# Patient Record
Sex: FEMALE | Race: WHITE | NOT HISPANIC OR LATINO | URBAN - METROPOLITAN AREA
[De-identification: names, ages, dates, MRNs, and addresses within clinical notes are randomized per-mention and may not be internally consistent; named-entity substitution may affect disease eponyms.]

---

## 2017-09-21 ENCOUNTER — OUTPATIENT (OUTPATIENT)
Dept: OUTPATIENT SERVICES | Facility: HOSPITAL | Age: 82
LOS: 1 days | End: 2017-09-21
Payer: MEDICARE

## 2017-09-21 VITALS
HEIGHT: 66 IN | WEIGHT: 122.58 LBS | DIASTOLIC BLOOD PRESSURE: 75 MMHG | RESPIRATION RATE: 80 BRPM | HEART RATE: 79 BPM | TEMPERATURE: 99 F | OXYGEN SATURATION: 99 % | SYSTOLIC BLOOD PRESSURE: 159 MMHG

## 2017-09-21 VITALS
HEART RATE: 89 BPM | OXYGEN SATURATION: 99 % | DIASTOLIC BLOOD PRESSURE: 78 MMHG | RESPIRATION RATE: 17 BRPM | SYSTOLIC BLOOD PRESSURE: 154 MMHG

## 2017-09-21 DIAGNOSIS — H40.9 UNSPECIFIED GLAUCOMA: Chronic | ICD-10-CM

## 2017-09-21 DIAGNOSIS — Z98.890 OTHER SPECIFIED POSTPROCEDURAL STATES: Chronic | ICD-10-CM

## 2017-09-21 DIAGNOSIS — E11.9 TYPE 2 DIABETES MELLITUS WITHOUT COMPLICATIONS: Chronic | ICD-10-CM

## 2017-09-21 DIAGNOSIS — H25.811 COMBINED FORMS OF AGE-RELATED CATARACT, RIGHT EYE: ICD-10-CM

## 2017-09-21 DIAGNOSIS — I65.29 OCCLUSION AND STENOSIS OF UNSPECIFIED CAROTID ARTERY: Chronic | ICD-10-CM

## 2017-09-21 DIAGNOSIS — Z90.49 ACQUIRED ABSENCE OF OTHER SPECIFIED PARTS OF DIGESTIVE TRACT: Chronic | ICD-10-CM

## 2017-09-21 DIAGNOSIS — I10 ESSENTIAL (PRIMARY) HYPERTENSION: Chronic | ICD-10-CM

## 2017-09-21 DIAGNOSIS — E78.5 HYPERLIPIDEMIA, UNSPECIFIED: Chronic | ICD-10-CM

## 2017-09-21 PROCEDURE — C1780: CPT

## 2017-09-21 PROCEDURE — 66982 XCAPSL CTRC RMVL CPLX WO ECP: CPT | Mod: RT

## 2017-09-21 NOTE — ASU DISCHARGE PLAN (ADULT/PEDIATRIC). - SPECIAL INSTRUCTIONS
Your eye patch will remain on overnight. Your doctor will remove it at your appointment tomorrow and instruct you on what drops to take at that time. Continue drops as usual in the other eye. Bring the eye kit and all of your other eye medications  to the office for each visit. You may experience some itching or scratchiness following surgery. This is normal and is usually relieved with Tylenol which can be taken 650mg by mouth every 4-6 hours for pain. Avoid Aspirin or Motrin. If you experience persistent decrease in vision, pain, excessive bleeding , temperature above 101, persistent nausea or vomiting contact your surgeon at [phone].

## 2017-09-21 NOTE — ASU DISCHARGE PLAN (ADULT/PEDIATRIC). - NOTIFY
Bleeding that does not stop/Fever greater than 101/Inability to Tolerate Liquids or Foods/Pain not relieved by Medications/Persistent Nausea and Vomiting

## 2017-11-16 ENCOUNTER — OUTPATIENT (OUTPATIENT)
Dept: OUTPATIENT SERVICES | Facility: HOSPITAL | Age: 82
LOS: 1 days | End: 2017-11-16
Payer: MEDICARE

## 2017-11-16 VITALS
HEART RATE: 96 BPM | SYSTOLIC BLOOD PRESSURE: 136 MMHG | OXYGEN SATURATION: 97 % | DIASTOLIC BLOOD PRESSURE: 81 MMHG | RESPIRATION RATE: 19 BRPM

## 2017-11-16 VITALS
WEIGHT: 125.22 LBS | SYSTOLIC BLOOD PRESSURE: 157 MMHG | HEART RATE: 94 BPM | TEMPERATURE: 99 F | OXYGEN SATURATION: 97 % | DIASTOLIC BLOOD PRESSURE: 90 MMHG | HEIGHT: 62 IN | RESPIRATION RATE: 20 BRPM

## 2017-11-16 DIAGNOSIS — Z98.890 OTHER SPECIFIED POSTPROCEDURAL STATES: Chronic | ICD-10-CM

## 2017-11-16 DIAGNOSIS — H25.812 COMBINED FORMS OF AGE-RELATED CATARACT, LEFT EYE: ICD-10-CM

## 2017-11-16 DIAGNOSIS — Z98.49 CATARACT EXTRACTION STATUS, UNSPECIFIED EYE: Chronic | ICD-10-CM

## 2017-11-16 DIAGNOSIS — Z90.49 ACQUIRED ABSENCE OF OTHER SPECIFIED PARTS OF DIGESTIVE TRACT: Chronic | ICD-10-CM

## 2017-11-16 LAB — GLUCOSE BLDC GLUCOMTR-MCNC: 100 MG/DL — HIGH (ref 70–99)

## 2017-11-16 PROCEDURE — 82962 GLUCOSE BLOOD TEST: CPT

## 2017-11-16 PROCEDURE — 66982 XCAPSL CTRC RMVL CPLX WO ECP: CPT | Mod: LT

## 2017-11-16 PROCEDURE — C1780: CPT

## 2017-11-16 NOTE — ASU DISCHARGE PLAN (ADULT/PEDIATRIC). - SPECIAL INSTRUCTIONS
Your eye patch will remain on overnight. Your doctor will remove it at your appointment tomorrow and instruct you on what drops to take at that time. Continue drops as usual in the other eye. Bring the eye kit and all of your other eye medications  to the office for each visit. You may experience some itching or scratchiness following surgery. This is normal and is usually relieved with Tylenol which can be taken 650mg by mouth every 4-6 hours for pain. Avoid Aspirin or Motrin. If you experience persistent decrease in vision, pain, excessive bleeding , temperature above 101, persistent nausea or vomiting contact your surgeon at [phone].

## 2017-11-16 NOTE — ASU DISCHARGE PLAN (ADULT/PEDIATRIC). - NOTIFY
Persistent Nausea and Vomiting/Pain not relieved by Medications/Swelling that continues/Fever greater than 101/Bleeding that does not stop

## 2017-11-16 NOTE — ASU PATIENT PROFILE, ADULT - PSH
History of colonoscopy    Status post cataract extraction  right eye done in September 2017  Status post colectomy

## 2018-01-31 ENCOUNTER — EMERGENCY (EMERGENCY)
Facility: HOSPITAL | Age: 83
LOS: 1 days | Discharge: HOME | End: 2018-01-31
Attending: EMERGENCY MEDICINE
Payer: MEDICARE

## 2018-01-31 VITALS
SYSTOLIC BLOOD PRESSURE: 183 MMHG | OXYGEN SATURATION: 99 % | TEMPERATURE: 97 F | RESPIRATION RATE: 18 BRPM | DIASTOLIC BLOOD PRESSURE: 68 MMHG | HEART RATE: 88 BPM

## 2018-01-31 VITALS
RESPIRATION RATE: 18 BRPM | WEIGHT: 139.99 LBS | SYSTOLIC BLOOD PRESSURE: 220 MMHG | OXYGEN SATURATION: 99 % | HEART RATE: 68 BPM | HEIGHT: 63 IN | DIASTOLIC BLOOD PRESSURE: 96 MMHG

## 2018-01-31 DIAGNOSIS — Z98.49 CATARACT EXTRACTION STATUS, UNSPECIFIED EYE: Chronic | ICD-10-CM

## 2018-01-31 DIAGNOSIS — Z98.890 OTHER SPECIFIED POSTPROCEDURAL STATES: Chronic | ICD-10-CM

## 2018-01-31 DIAGNOSIS — Z90.49 ACQUIRED ABSENCE OF OTHER SPECIFIED PARTS OF DIGESTIVE TRACT: Chronic | ICD-10-CM

## 2018-01-31 LAB
ALBUMIN SERPL ELPH-MCNC: 3.8 G/DL — SIGNIFICANT CHANGE UP (ref 3.5–5)
ALP SERPL-CCNC: 128 U/L — HIGH (ref 40–120)
ALT FLD-CCNC: 32 U/L DA — SIGNIFICANT CHANGE UP (ref 10–60)
ANION GAP SERPL CALC-SCNC: 7 MMOL/L — SIGNIFICANT CHANGE UP (ref 5–17)
AST SERPL-CCNC: 31 U/L — SIGNIFICANT CHANGE UP (ref 10–40)
BASOPHILS # BLD AUTO: 0.1 K/UL — SIGNIFICANT CHANGE UP (ref 0–0.2)
BASOPHILS NFR BLD AUTO: 0.7 % — SIGNIFICANT CHANGE UP (ref 0–2)
BILIRUB SERPL-MCNC: 0.5 MG/DL — SIGNIFICANT CHANGE UP (ref 0.2–1.2)
BUN SERPL-MCNC: 26 MG/DL — HIGH (ref 7–18)
CALCIUM SERPL-MCNC: 9.8 MG/DL — SIGNIFICANT CHANGE UP (ref 8.4–10.5)
CHLORIDE SERPL-SCNC: 102 MMOL/L — SIGNIFICANT CHANGE UP (ref 96–108)
CO2 SERPL-SCNC: 28 MMOL/L — SIGNIFICANT CHANGE UP (ref 22–31)
CREAT SERPL-MCNC: 0.84 MG/DL — SIGNIFICANT CHANGE UP (ref 0.5–1.3)
EOSINOPHIL # BLD AUTO: 0.1 K/UL — SIGNIFICANT CHANGE UP (ref 0–0.5)
EOSINOPHIL NFR BLD AUTO: 1.2 % — SIGNIFICANT CHANGE UP (ref 0–6)
GLUCOSE SERPL-MCNC: 147 MG/DL — HIGH (ref 70–99)
HCT VFR BLD CALC: 45.6 % — HIGH (ref 34.5–45)
HGB BLD-MCNC: 13.7 G/DL — SIGNIFICANT CHANGE UP (ref 11.5–15.5)
LYMPHOCYTES # BLD AUTO: 0.8 K/UL — LOW (ref 1–3.3)
LYMPHOCYTES # BLD AUTO: 8.7 % — LOW (ref 13–44)
MCHC RBC-ENTMCNC: 28 PG — SIGNIFICANT CHANGE UP (ref 27–34)
MCHC RBC-ENTMCNC: 30 GM/DL — LOW (ref 32–36)
MCV RBC AUTO: 93.1 FL — SIGNIFICANT CHANGE UP (ref 80–100)
MONOCYTES # BLD AUTO: 0.5 K/UL — SIGNIFICANT CHANGE UP (ref 0–0.9)
MONOCYTES NFR BLD AUTO: 4.7 % — SIGNIFICANT CHANGE UP (ref 2–14)
NEUTROPHILS # BLD AUTO: 8.1 K/UL — HIGH (ref 1.8–7.4)
NEUTROPHILS NFR BLD AUTO: 84.6 % — HIGH (ref 43–77)
PLATELET # BLD AUTO: 252 K/UL — SIGNIFICANT CHANGE UP (ref 150–400)
POTASSIUM SERPL-MCNC: 3.6 MMOL/L — SIGNIFICANT CHANGE UP (ref 3.5–5.3)
POTASSIUM SERPL-SCNC: 3.6 MMOL/L — SIGNIFICANT CHANGE UP (ref 3.5–5.3)
PROT SERPL-MCNC: 8.3 G/DL — SIGNIFICANT CHANGE UP (ref 6–8.3)
RBC # BLD: 4.9 M/UL — SIGNIFICANT CHANGE UP (ref 3.8–5.2)
RBC # FLD: 13.4 % — SIGNIFICANT CHANGE UP (ref 10.3–14.5)
SODIUM SERPL-SCNC: 137 MMOL/L — SIGNIFICANT CHANGE UP (ref 135–145)
WBC # BLD: 9.6 K/UL — SIGNIFICANT CHANGE UP (ref 3.8–10.5)
WBC # FLD AUTO: 9.6 K/UL — SIGNIFICANT CHANGE UP (ref 3.8–10.5)

## 2018-01-31 PROCEDURE — 85027 COMPLETE CBC AUTOMATED: CPT

## 2018-01-31 PROCEDURE — 80053 COMPREHEN METABOLIC PANEL: CPT

## 2018-01-31 PROCEDURE — 99284 EMERGENCY DEPT VISIT MOD MDM: CPT

## 2018-01-31 PROCEDURE — 93005 ELECTROCARDIOGRAM TRACING: CPT

## 2018-01-31 PROCEDURE — 99284 EMERGENCY DEPT VISIT MOD MDM: CPT | Mod: 25

## 2018-01-31 NOTE — ED ADULT NURSE REASSESSMENT NOTE - NS ED NURSE REASSESS COMMENT FT1
Pt. is a&ox3, stable, and in no acute distress. Pt. denies any weakness, dizziness, or pain. Pt. is speaking coherently. No signs of acute distress noted. Pt. educated on hypoglycemia. Pt. is stable for discharge.

## 2018-01-31 NOTE — ED ADULT TRIAGE NOTE - CHIEF COMPLAINT QUOTE
called by bystanders, picked up on [address], on scene FS of 26, 2 amp of D5 given, FS at triage 322.

## 2018-01-31 NOTE — ED ADULT NURSE NOTE - CHPI ED SYMPTOMS NEG
no diaphoresis/no chills/no fever/no dizziness/no chest pain/no cough/no shortness of breath/no back pain/no nausea/no vomiting

## 2018-01-31 NOTE — ED PROVIDER NOTE - MEDICAL DECISION MAKING DETAILS
75 y/o F pt w/ hypoglycemia. Will do labs, feed, discharge 73 y/o F pt w/ hypoglycemia. Will do labs, feed, discharge.  observed in Emergency Department, remained alert and oriented. ate a sandwich, fs normal. discahrge home, instructed to eat regular meals. home with friend who will take patient home.

## 2019-02-05 NOTE — ED PROVIDER NOTE - OBJECTIVE STATEMENT
4
73 y/o F pt w/ PMHx of HTN, HLD, and DM (on Humalog) was BIB EMS to ED c/o AMS today. Pt states that she went to a senior's meeting, started walking to the store and began to feel strange and confused; pt reports that it felt like she was dreaming. Pt relates that she last took Humalog 5 units at lunch time but did not eat any lunch. Per EMS pt's FS was 26 at the scene. Pt denies chest pain, SOB, abd pain, nausea, vomiting, or any other complaints. NKDA.

## 2019-08-12 ENCOUNTER — INPATIENT (INPATIENT)
Facility: HOSPITAL | Age: 84
LOS: 2 days | Discharge: NURSING, REHAB OR LONG-TERM CARE | DRG: 638 | End: 2019-08-15
Attending: INTERNAL MEDICINE | Admitting: INTERNAL MEDICINE
Payer: MEDICARE

## 2019-08-12 VITALS
RESPIRATION RATE: 18 BRPM | HEART RATE: 99 BPM | WEIGHT: 116.84 LBS | DIASTOLIC BLOOD PRESSURE: 87 MMHG | SYSTOLIC BLOOD PRESSURE: 146 MMHG | TEMPERATURE: 98 F | OXYGEN SATURATION: 99 %

## 2019-08-12 DIAGNOSIS — Z98.890 OTHER SPECIFIED POSTPROCEDURAL STATES: Chronic | ICD-10-CM

## 2019-08-12 DIAGNOSIS — Z90.49 ACQUIRED ABSENCE OF OTHER SPECIFIED PARTS OF DIGESTIVE TRACT: Chronic | ICD-10-CM

## 2019-08-12 DIAGNOSIS — S80.822A BLISTER (NONTHERMAL), LEFT LOWER LEG, INITIAL ENCOUNTER: ICD-10-CM

## 2019-08-12 DIAGNOSIS — Z98.49 CATARACT EXTRACTION STATUS, UNSPECIFIED EYE: Chronic | ICD-10-CM

## 2019-08-12 DIAGNOSIS — Z29.9 ENCOUNTER FOR PROPHYLACTIC MEASURES, UNSPECIFIED: ICD-10-CM

## 2019-08-12 DIAGNOSIS — R60.0 LOCALIZED EDEMA: ICD-10-CM

## 2019-08-12 DIAGNOSIS — I10 ESSENTIAL (PRIMARY) HYPERTENSION: ICD-10-CM

## 2019-08-12 DIAGNOSIS — E78.5 HYPERLIPIDEMIA, UNSPECIFIED: ICD-10-CM

## 2019-08-12 DIAGNOSIS — E11.9 TYPE 2 DIABETES MELLITUS WITHOUT COMPLICATIONS: ICD-10-CM

## 2019-08-12 DIAGNOSIS — L98.491 NON-PRESSURE CHRONIC ULCER OF SKIN OF OTHER SITES LIMITED TO BREAKDOWN OF SKIN: ICD-10-CM

## 2019-08-12 LAB
ALBUMIN SERPL ELPH-MCNC: 3.3 G/DL — LOW (ref 3.5–5)
ALP SERPL-CCNC: 99 U/L — SIGNIFICANT CHANGE UP (ref 40–120)
ALT FLD-CCNC: 22 U/L DA — SIGNIFICANT CHANGE UP (ref 10–60)
ANION GAP SERPL CALC-SCNC: 4 MMOL/L — LOW (ref 5–17)
APPEARANCE UR: CLEAR — SIGNIFICANT CHANGE UP
AST SERPL-CCNC: 20 U/L — SIGNIFICANT CHANGE UP (ref 10–40)
BASOPHILS # BLD AUTO: 0.03 K/UL — SIGNIFICANT CHANGE UP (ref 0–0.2)
BASOPHILS NFR BLD AUTO: 0.6 % — SIGNIFICANT CHANGE UP (ref 0–2)
BILIRUB SERPL-MCNC: 0.4 MG/DL — SIGNIFICANT CHANGE UP (ref 0.2–1.2)
BILIRUB UR-MCNC: NEGATIVE — SIGNIFICANT CHANGE UP
BUN SERPL-MCNC: 27 MG/DL — HIGH (ref 7–18)
CALCIUM SERPL-MCNC: 9.3 MG/DL — SIGNIFICANT CHANGE UP (ref 8.4–10.5)
CHLORIDE SERPL-SCNC: 108 MMOL/L — SIGNIFICANT CHANGE UP (ref 96–108)
CO2 SERPL-SCNC: 29 MMOL/L — SIGNIFICANT CHANGE UP (ref 22–31)
COLOR SPEC: YELLOW — SIGNIFICANT CHANGE UP
CREAT SERPL-MCNC: 1.08 MG/DL — SIGNIFICANT CHANGE UP (ref 0.5–1.3)
DIFF PNL FLD: ABNORMAL
EOSINOPHIL # BLD AUTO: 0.08 K/UL — SIGNIFICANT CHANGE UP (ref 0–0.5)
EOSINOPHIL NFR BLD AUTO: 1.7 % — SIGNIFICANT CHANGE UP (ref 0–6)
GLUCOSE BLDC GLUCOMTR-MCNC: 118 MG/DL — HIGH (ref 70–99)
GLUCOSE SERPL-MCNC: 180 MG/DL — HIGH (ref 70–99)
GLUCOSE UR QL: 100 MG/DL
HCT VFR BLD CALC: 36.2 % — SIGNIFICANT CHANGE UP (ref 34.5–45)
HGB BLD-MCNC: 11.2 G/DL — LOW (ref 11.5–15.5)
IMM GRANULOCYTES NFR BLD AUTO: 0.4 % — SIGNIFICANT CHANGE UP (ref 0–1.5)
KETONES UR-MCNC: NEGATIVE — SIGNIFICANT CHANGE UP
LEUKOCYTE ESTERASE UR-ACNC: ABNORMAL
LYMPHOCYTES # BLD AUTO: 0.68 K/UL — LOW (ref 1–3.3)
LYMPHOCYTES # BLD AUTO: 14.3 % — SIGNIFICANT CHANGE UP (ref 13–44)
MAGNESIUM SERPL-MCNC: 2 MG/DL — SIGNIFICANT CHANGE UP (ref 1.6–2.6)
MCHC RBC-ENTMCNC: 28.8 PG — SIGNIFICANT CHANGE UP (ref 27–34)
MCHC RBC-ENTMCNC: 30.9 GM/DL — LOW (ref 32–36)
MCV RBC AUTO: 93.1 FL — SIGNIFICANT CHANGE UP (ref 80–100)
MONOCYTES # BLD AUTO: 0.48 K/UL — SIGNIFICANT CHANGE UP (ref 0–0.9)
MONOCYTES NFR BLD AUTO: 10.1 % — SIGNIFICANT CHANGE UP (ref 2–14)
NEUTROPHILS # BLD AUTO: 3.47 K/UL — SIGNIFICANT CHANGE UP (ref 1.8–7.4)
NEUTROPHILS NFR BLD AUTO: 72.9 % — SIGNIFICANT CHANGE UP (ref 43–77)
NITRITE UR-MCNC: POSITIVE
NRBC # BLD: 0 /100 WBCS — SIGNIFICANT CHANGE UP (ref 0–0)
NT-PROBNP SERPL-SCNC: 933 PG/ML — HIGH (ref 0–450)
PH UR: 5 — SIGNIFICANT CHANGE UP (ref 5–8)
PLATELET # BLD AUTO: 228 K/UL — SIGNIFICANT CHANGE UP (ref 150–400)
POTASSIUM SERPL-MCNC: 4 MMOL/L — SIGNIFICANT CHANGE UP (ref 3.5–5.3)
POTASSIUM SERPL-SCNC: 4 MMOL/L — SIGNIFICANT CHANGE UP (ref 3.5–5.3)
PROT SERPL-MCNC: 7 G/DL — SIGNIFICANT CHANGE UP (ref 6–8.3)
PROT UR-MCNC: 30 MG/DL
RBC # BLD: 3.89 M/UL — SIGNIFICANT CHANGE UP (ref 3.8–5.2)
RBC # FLD: 14.3 % — SIGNIFICANT CHANGE UP (ref 10.3–14.5)
SODIUM SERPL-SCNC: 141 MMOL/L — SIGNIFICANT CHANGE UP (ref 135–145)
SP GR SPEC: 1.01 — SIGNIFICANT CHANGE UP (ref 1.01–1.02)
UROBILINOGEN FLD QL: NEGATIVE — SIGNIFICANT CHANGE UP
WBC # BLD: 4.76 K/UL — SIGNIFICANT CHANGE UP (ref 3.8–10.5)
WBC # FLD AUTO: 4.76 K/UL — SIGNIFICANT CHANGE UP (ref 3.8–10.5)

## 2019-08-12 PROCEDURE — 99285 EMERGENCY DEPT VISIT HI MDM: CPT

## 2019-08-12 PROCEDURE — 73590 X-RAY EXAM OF LOWER LEG: CPT | Mod: 26,LT

## 2019-08-12 PROCEDURE — 71045 X-RAY EXAM CHEST 1 VIEW: CPT | Mod: 26

## 2019-08-12 RX ORDER — METOPROLOL TARTRATE 50 MG
50 TABLET ORAL
Refills: 0 | Status: DISCONTINUED | OUTPATIENT
Start: 2019-08-12 | End: 2019-08-12

## 2019-08-12 RX ORDER — VANCOMYCIN HCL 1 G
1000 VIAL (EA) INTRAVENOUS ONCE
Refills: 0 | Status: COMPLETED | OUTPATIENT
Start: 2019-08-12 | End: 2019-08-12

## 2019-08-12 RX ORDER — METOPROLOL TARTRATE 50 MG
50 TABLET ORAL
Refills: 0 | Status: DISCONTINUED | OUTPATIENT
Start: 2019-08-12 | End: 2019-08-13

## 2019-08-12 RX ORDER — INSULIN GLARGINE 100 [IU]/ML
10 INJECTION, SOLUTION SUBCUTANEOUS AT BEDTIME
Refills: 0 | Status: DISCONTINUED | OUTPATIENT
Start: 2019-08-12 | End: 2019-08-15

## 2019-08-12 RX ORDER — AMPICILLIN SODIUM AND SULBACTAM SODIUM 250; 125 MG/ML; MG/ML
1.5 INJECTION, POWDER, FOR SUSPENSION INTRAMUSCULAR; INTRAVENOUS EVERY 6 HOURS
Refills: 0 | Status: DISCONTINUED | OUTPATIENT
Start: 2019-08-12 | End: 2019-08-13

## 2019-08-12 RX ORDER — POVIDONE-IODINE 5 %
1 AEROSOL (ML) TOPICAL DAILY
Refills: 0 | Status: DISCONTINUED | OUTPATIENT
Start: 2019-08-12 | End: 2019-08-13

## 2019-08-12 RX ORDER — INSULIN LISPRO 100/ML
VIAL (ML) SUBCUTANEOUS
Refills: 0 | Status: DISCONTINUED | OUTPATIENT
Start: 2019-08-12 | End: 2019-08-15

## 2019-08-12 RX ORDER — HEPARIN SODIUM 5000 [USP'U]/ML
5000 INJECTION INTRAVENOUS; SUBCUTANEOUS EVERY 8 HOURS
Refills: 0 | Status: DISCONTINUED | OUTPATIENT
Start: 2019-08-12 | End: 2019-08-15

## 2019-08-12 RX ADMIN — INSULIN GLARGINE 10 UNIT(S): 100 INJECTION, SOLUTION SUBCUTANEOUS at 23:34

## 2019-08-12 RX ADMIN — HEPARIN SODIUM 5000 UNIT(S): 5000 INJECTION INTRAVENOUS; SUBCUTANEOUS at 23:34

## 2019-08-12 RX ADMIN — AMPICILLIN SODIUM AND SULBACTAM SODIUM 100 GRAM(S): 250; 125 INJECTION, POWDER, FOR SUSPENSION INTRAMUSCULAR; INTRAVENOUS at 20:21

## 2019-08-12 RX ADMIN — Medication 50 MILLIGRAM(S): at 22:17

## 2019-08-12 RX ADMIN — Medication 250 MILLIGRAM(S): at 20:22

## 2019-08-12 NOTE — ED PROVIDER NOTE - SKIN WOUND DESCRIPTION
6 x7 cm circler clean based ulcer, erythematous no pus, did not appear infected does not smell, copious clear discharge that has no skin breakages

## 2019-08-12 NOTE — H&P ADULT - PROBLEM SELECTOR PLAN 2
likely likely from varicose veins pt has h/o varicose veins  on examination has pedal edema which is present since long since long and has been using stockings

## 2019-08-12 NOTE — H&P ADULT - NSICDXPASTSURGICALHX_GEN_ALL_CORE_FT
PAST SURGICAL HISTORY:  History of colonoscopy     Status post cataract extraction right eye done in September 2017    Status post colectomy

## 2019-08-12 NOTE — ED PROVIDER NOTE - CARE PLAN
Principal Discharge DX:	Blister of left lower extremity, initial encounter  Secondary Diagnosis:	Leg edema

## 2019-08-12 NOTE — ED ADULT NURSE NOTE - CHPI ED NUR SYMPTOMS NEG
no bleeding at site/no purulent drainage/no vomiting/no pain/no rectal pain/no fever/no chills/no blood in mucus

## 2019-08-12 NOTE — ED ADULT NURSE NOTE - OBJECTIVE STATEMENT
Patient present to ED BIB family members due to B/L lower extremities swelling with open blister to right lower leg with weeping.

## 2019-08-12 NOTE — H&P ADULT - NS MD HP SKIN WOUND STATUS
the patient has large 4*4 cm superficial ulcer with oozing of clear fluid. there are other multiple small boils along the shin of R and l leg.

## 2019-08-12 NOTE — H&P ADULT - PROBLEM SELECTOR PLAN 5
insulin 17u at bedtime, 5 humalog before each meal  insulin sliding scale IMPROVE VTE Individual Risk Assessment    RISK                                                                Points    [  ] Previous VTE                                                  3    [  ] Thrombophilia                                               2    [  ] Lower limb paralysis                                      2        (unable to hold up >15 seconds)      [  ] Current Cancer                                              2         (within 6 months)    [  ] Immobilization > 24 hrs                                1  [  ] ICU/CCU stay > 24 hours                              1  [1  ] Age > 60                                                      1  IMPROVE VTE Score __1.   IMPROVE Score1: started on heparin as prophylaxis

## 2019-08-12 NOTE — H&P ADULT - ASSESSMENT
Pt admitted for LLE ulcer with cellulitis along with requiring HHA for help at home as per niece/nephew

## 2019-08-12 NOTE — ED PROVIDER NOTE - PMH
Carotid stenosis    DM (diabetes mellitus)    Glaucoma    HLD (hyperlipidemia)    HLD (hyperlipidemia)    HTN (hypertension)    Hypertension

## 2019-08-12 NOTE — H&P ADULT - NSHPLABSRESULTS_GEN_ALL_CORE
LABS:      CBC Full  -  ( 12 Aug 2019 16:16 )  WBC Count : 4.76 K/uL  RBC Count : 3.89 M/uL  Hemoglobin : 11.2 g/dL  Hematocrit : 36.2 %  Platelet Count - Automated : 228 K/uL  Mean Cell Volume : 93.1 fl  Mean Cell Hemoglobin : 28.8 pg  Mean Cell Hemoglobin Concentration : 30.9 gm/dL  Auto Neutrophil # : 3.47 K/uL  Auto Lymphocyte # : 0.68 K/uL  Auto Monocyte # : 0.48 K/uL  Auto Eosinophil # : 0.08 K/uL  Auto Basophil # : 0.03 K/uL  Auto Neutrophil % : 72.9 %  Auto Lymphocyte % : 14.3 %  Auto Monocyte % : 10.1 %  Auto Eosinophil % : 1.7 %  Auto Basophil % : 0.6 %        141  |  108  |  27<H>  ----------------------------<  180<H>  4.0   |  29  |  1.08    Ca    9.3      12 Aug 2019 16:16  Mg     2.0         TPro  7.0  /  Alb  3.3<L>  /  TBili  0.4  /  DBili  x   /  AST  20  /  ALT  22  /  AlkPhos  99  08-12      Urinalysis Basic - ( 12 Aug 2019 18:02 )    Color: Yellow / Appearance: Clear / S.015 / pH: x  Gluc: x / Ketone: Negative  / Bili: Negative / Urobili: Negative   Blood: x / Protein: 30 mg/dL / Nitrite: Positive   Leuk Esterase: Small / RBC: 2-5 /HPF / WBC 6-10 /HPF   Sq Epi: x / Non Sq Epi: Few /HPF / Bacteria: Many /HPF        RADIOLOGY & ADDITIONAL STUDIES (The following images were personally reviewed):

## 2019-08-12 NOTE — H&P ADULT - PROBLEM SELECTOR PLAN 1
c/w local wound care, s/p 1 dose of vancomycin, c/w unasyn for now  f/u ID consult  f/u podiatry consult

## 2019-08-12 NOTE — H&P ADULT - NSHPPHYSICALEXAM_GEN_ALL_CORE
Vital Signs Last 24 Hrs  T(C): 36.4 (12 Aug 2019 18:48), Max: 36.8 (12 Aug 2019 15:20)  T(F): 97.5 (12 Aug 2019 18:48), Max: 98.2 (12 Aug 2019 15:20)  HR: 90 (12 Aug 2019 18:48) (90 - 99)  BP: 173/88 (12 Aug 2019 18:48) (146/87 - 173/88)  RR: 16 (12 Aug 2019 18:48) (16 - 18)  SpO2: 98% (12 Aug 2019 18:48) (98% - 99%)    ________________________________________________  PHYSICAL EXAM:  GENERAL: NAD  HEENT: Normocephalic;  conjunctivae and sclerae clear; moist mucous membranes;   NECK : supple, no JVD  CHEST/LUNG: Clear to auscultation bilaterally with good air entry   HEART: S1 S2  regular; no murmurs, gallops or rubs  ABDOMEN: Soft, Nontender, Nondistended; Bowel sounds present  EXTREMITIES: no cyanosis; no edema; no calf tenderness  NERVOUS SYSTEM:  Awake and alert; Oriented  to place, person and time

## 2019-08-12 NOTE — ED PROVIDER NOTE - CONSTITUTIONAL, MLM
normal... Well appearing, comfortable, well nourished, awake, alert, oriented to person, place, time/situation and in no apparent distress.

## 2019-08-12 NOTE — ED ADULT NURSE NOTE - ED STAT RN HANDOFF DETAILS
Patient admitted  to medicine in no acute distress all labs sent. NO bed assigned as yet patient endorsed to JASON Titus in holding . Patient to be transferred via stretcher family at bedside stable in no acute distress.

## 2019-08-12 NOTE — H&P ADULT - PROBLEM SELECTOR PLAN 3
has h/o diabetes   f/u hba1c   insulin 17u at bedtime, 5 humalog before each meal  insulin sliding scale  strict control of blood sugars

## 2019-08-12 NOTE — H&P ADULT - HISTORY OF PRESENT ILLNESS
84 yo F, ambulates with walker, with PMH of DM, OA, colon cancer in the past s/p remission presents to the ED with with complaints of 3-4d of severe bilateral LE edema with skin break in the left, 7 cm x 7cm circular ulcer anterior aspect on the shin. Patient lives alone, uses insulin, and see the doctor for her cataracts. Patient states denies hx of heart failure, coronary artery disease. Patient reports that the pain began bilaterally with copious discharge fluids, placed an ACE bandage that was soaked through. Patient has a large ulcer in the shin that has been presents for 3-4d. Pt is a poor historian, Patient is accompanied by niece and nephew, sent to the ED by them to get it checked, also concerned regarding pt's ability to take care of herself at home, requesting possible HHA. Patient denies any fever, PINEDO, chest pain, or any other complain

## 2019-08-12 NOTE — CONSULT NOTE ADULT - SUBJECTIVE AND OBJECTIVE BOX
S : 83y year old Female seen at bedside for Left leg ulceration.  Patient relates to having the ulceration for few days . Pt states that it started with a blister which she thought was caused by bug bite but then started growing big and started draining lot of fluid and since yesterday she noticed her left leg and foot turned red and painful.  Chief Complaint : Patient is a 83y old  Female who presents with a chief complaint of left leg ulcer (12 Aug 2019 19:39)    HPI : HPI:  82 yo F, ambulates with walker, with PMH of DM, OA, colon cancer in the past s/p remission presents to the ED with with complaints of 3-4d of severe bilateral LE edema with skin break in the left, 7 cm x 7cm circular ulcer anterior aspect on the shin. Patient lives alone, uses insulin, and see the doctor for her cataracts. Patient states denies hx of heart failure, coronary artery disease. Patient reports that the pain began bilaterally with copious discharge fluids, placed an ACE bandage that was soaked through. Patient has a large ulcer in the shin that has been presents for 3-4d. Pt is a poor historian, Patient is accompanied by niece and nephew, sent to the ED by them to get it checked, also concerned regarding pt's ability to take care of herself at home, requesting possible HHA. Patient denies any fever, PINEDO, chest pain, or any other complain (12 Aug 2019 19:39)      Patient admits to  (-) Fevers, (-) Chills, (-) Nausea, (-) Vomiting, (-) Shortness of Breath      PMH: Diabetes mellitus  DM (diabetes mellitus)  HLD (hyperlipidemia)  HTN (hypertension)  Glaucoma  Carotid stenosis  HLD (hyperlipidemia)  Hypertension    PSH:Status post cataract extraction  History of colonoscopy  Status post colectomy  Type 2 diabetes mellitus  Carotid stenosis  Glaucoma  HLD (hyperlipidemia)  Hypertension      Allergies:No Known Drug Allergies  shellfish (Anaphylaxis)      Labs:                          11.2   4.76  )-----------( 228      ( 12 Aug 2019 16:16 )             36.2     WBC Trend  4.76 Date (08-12 @ 16:16)      Chem  08-12    141  |  108  |  27<H>  ----------------------------<  180<H>  4.0   |  29  |  1.08    Ca    9.3      12 Aug 2019 16:16  Mg     2.0     08-12    TPro  7.0  /  Alb  3.3<L>  /  TBili  0.4  /  DBili  x   /  AST  20  /  ALT  22  /  AlkPhos  99  08-12          T(F): 97.4 (08-12-19 @ 20:28), Max: 98.2 (08-12-19 @ 15:20)  HR: 89 (08-12-19 @ 20:28) (89 - 99)  BP: 170/74 (08-12-19 @ 20:28) (146/87 - 173/88)  RR: 17 (08-12-19 @ 20:28) (16 - 18)  SpO2: 99% (08-12-19 @ 20:28) (98% - 99%)  Wt(kg): --    O:   General: Pleasant  female NAD & AOX3.    Integument:  Skin warm, dry and supple bilateral.    Ulceration Left anterior leg superficial in nature, - hyperkeratotic border, wound base Granular , wound size (4 cm X 4cm X 0.1cm) +4 leg edema, +aditya-wound erythema, - purulence, - fluctuance,   - tracking/tunneling, - probe to bone.   Vascular: Dorsalis Pedis and Posterior Tibial pulses 1/4.  Capillary re-fill time less then 5 seconds digits 1-5 bilateral.    Neuro: Protective sensation intact to the level of the digits bilateral.  MSK: Muscle strength 4/5 all major muscle groups bilateral.  Deformity:  A: Left anterior leg ulceration with leg edema      P:   Chart reviewed and Patient evaluated  Discussed diagnosis and treatment with patient  Wound flush with normal saline  Obtained wound culture to be sent to Pathology  Applied betadine with dry sterile dressing with multilayer compression,  X-rays ordered  Continue with IV antibiotics As Per ID  Ordered TERRELL  Weight bearing as tolerated  Offloading to bilateral Heels and leg elevation   Podiatry will follow while in house.  Discussed with Attending Dr Amaro

## 2019-08-12 NOTE — ED PROVIDER NOTE - CLINICAL SUMMARY MEDICAL DECISION MAKING FREE TEXT BOX
82 yo w lower leg edema, inability to take care of self at home, will admit for further eval and SW/rehab eval.

## 2019-08-12 NOTE — ED PROVIDER NOTE - OBJECTIVE STATEMENT
82 y/o F patient with a significant PMHx of DM, OA, colon cancer and no significant PSHx presents to the ED with c/o 3-4d of severe bilateral LE edema with skin break in the left, 7 cm x 7cm circular ulcer anterior aspect on the shin. Patient lives alone, uses insulin, and see the doctor for her cataracts. Patient states having no hx of CHF, no CAD. Patient reports that the pain began bilaterally with copious discharge fluids, placed an ACE bandage that is leaking. Patient has a large ulcer in the shin that has been presents for 3-4d. Pt is a poor historian, Patient is accompanied by niece and nephew, sent to the ED by them to get it check. Patient denies any PINEDO, chest pain, or any other complains. Patient reports able to ambulate with her walker. 84 y/o F patient with a significant PMHx of DM, OA, colon cancer and no significant PSHx presents to the ED with c/o 3-4d of severe bilateral LE edema with skin break in the left, 7 cm x 7cm circular ulcer anterior aspect on the shin. Patient lives alone, uses insulin. Patient states having no hx of CHF, no CAD. Patient reports that the pain began bilaterally with copious discharge fluids, placed an ACE bandage that is leaking. Patient has a large ulcer in the shin that has been presents for 3-4d. Pt is a poor historian, Patient is accompanied by niece and nephew, sent to the ED by them to get it checked. Patient denies any PINEDO, chest pain, or any other complains. Patient reports able to ambulate with her walker.

## 2019-08-12 NOTE — H&P ADULT - PROBLEM SELECTOR PLAN 4
Patient was found to have high blood pressure on admission.   Metoprolol was started.  monitor Bp and optimize medications accordingly.  Patient said she doesn't take any medication for Your blood pressure was well-controlled during your admission. Continue with your current regimen of anti-hypertensive medications as mentioned in your discharge summary and ensure that you follow up with your primary care provider for further recommendations and monitoring. at home

## 2019-08-13 LAB
ALBUMIN SERPL ELPH-MCNC: 2.9 G/DL — LOW (ref 3.5–5)
ALP SERPL-CCNC: 109 U/L — SIGNIFICANT CHANGE UP (ref 40–120)
ALT FLD-CCNC: 21 U/L DA — SIGNIFICANT CHANGE UP (ref 10–60)
ANION GAP SERPL CALC-SCNC: 3 MMOL/L — LOW (ref 5–17)
ANION GAP SERPL CALC-SCNC: 4 MMOL/L — LOW (ref 5–17)
APPEARANCE UR: CLEAR — SIGNIFICANT CHANGE UP
AST SERPL-CCNC: 23 U/L — SIGNIFICANT CHANGE UP (ref 10–40)
BACTERIA # UR AUTO: ABNORMAL /HPF
BASOPHILS # BLD AUTO: 0.05 K/UL — SIGNIFICANT CHANGE UP (ref 0–0.2)
BASOPHILS NFR BLD AUTO: 0.9 % — SIGNIFICANT CHANGE UP (ref 0–2)
BILIRUB SERPL-MCNC: 0.4 MG/DL — SIGNIFICANT CHANGE UP (ref 0.2–1.2)
BILIRUB UR-MCNC: NEGATIVE — SIGNIFICANT CHANGE UP
BUN SERPL-MCNC: 25 MG/DL — HIGH (ref 7–18)
BUN SERPL-MCNC: 25 MG/DL — HIGH (ref 7–18)
CALCIUM SERPL-MCNC: 9.2 MG/DL — SIGNIFICANT CHANGE UP (ref 8.4–10.5)
CALCIUM SERPL-MCNC: 9.3 MG/DL — SIGNIFICANT CHANGE UP (ref 8.4–10.5)
CHLORIDE SERPL-SCNC: 102 MMOL/L — SIGNIFICANT CHANGE UP (ref 96–108)
CHLORIDE SERPL-SCNC: 104 MMOL/L — SIGNIFICANT CHANGE UP (ref 96–108)
CHOLEST SERPL-MCNC: 249 MG/DL — HIGH (ref 10–199)
CO2 SERPL-SCNC: 31 MMOL/L — SIGNIFICANT CHANGE UP (ref 22–31)
CO2 SERPL-SCNC: 31 MMOL/L — SIGNIFICANT CHANGE UP (ref 22–31)
COLOR SPEC: YELLOW — SIGNIFICANT CHANGE UP
CREAT SERPL-MCNC: 0.97 MG/DL — SIGNIFICANT CHANGE UP (ref 0.5–1.3)
CREAT SERPL-MCNC: 1.03 MG/DL — SIGNIFICANT CHANGE UP (ref 0.5–1.3)
DIFF PNL FLD: NEGATIVE — SIGNIFICANT CHANGE UP
EOSINOPHIL # BLD AUTO: 0.11 K/UL — SIGNIFICANT CHANGE UP (ref 0–0.5)
EOSINOPHIL NFR BLD AUTO: 2 % — SIGNIFICANT CHANGE UP (ref 0–6)
EPI CELLS # UR: ABNORMAL /HPF
ERYTHROCYTE [SEDIMENTATION RATE] IN BLOOD: 27 MM/HR — HIGH (ref 0–20)
GLUCOSE BLDC GLUCOMTR-MCNC: 149 MG/DL — HIGH (ref 70–99)
GLUCOSE BLDC GLUCOMTR-MCNC: 266 MG/DL — HIGH (ref 70–99)
GLUCOSE BLDC GLUCOMTR-MCNC: 340 MG/DL — HIGH (ref 70–99)
GLUCOSE SERPL-MCNC: 114 MG/DL — HIGH (ref 70–99)
GLUCOSE SERPL-MCNC: 194 MG/DL — HIGH (ref 70–99)
GLUCOSE UR QL: 1000 MG/DL
HBA1C BLD-MCNC: 8.6 % — HIGH (ref 4–5.6)
HBA1C BLD-MCNC: 8.7 % — HIGH (ref 4–5.6)
HCT VFR BLD CALC: 39.1 % — SIGNIFICANT CHANGE UP (ref 34.5–45)
HCT VFR BLD CALC: 39.6 % — SIGNIFICANT CHANGE UP (ref 34.5–45)
HDLC SERPL-MCNC: 84 MG/DL — SIGNIFICANT CHANGE UP
HGB BLD-MCNC: 12 G/DL — SIGNIFICANT CHANGE UP (ref 11.5–15.5)
HGB BLD-MCNC: 12.2 G/DL — SIGNIFICANT CHANGE UP (ref 11.5–15.5)
IMM GRANULOCYTES NFR BLD AUTO: 0.4 % — SIGNIFICANT CHANGE UP (ref 0–1.5)
KETONES UR-MCNC: NEGATIVE — SIGNIFICANT CHANGE UP
LACTATE SERPL-SCNC: 1.7 MMOL/L — SIGNIFICANT CHANGE UP (ref 0.7–2)
LEUKOCYTE ESTERASE UR-ACNC: ABNORMAL
LIPID PNL WITH DIRECT LDL SERPL: 137 MG/DL — SIGNIFICANT CHANGE UP
LYMPHOCYTES # BLD AUTO: 0.73 K/UL — LOW (ref 1–3.3)
LYMPHOCYTES # BLD AUTO: 13.2 % — SIGNIFICANT CHANGE UP (ref 13–44)
MCHC RBC-ENTMCNC: 28.6 PG — SIGNIFICANT CHANGE UP (ref 27–34)
MCHC RBC-ENTMCNC: 28.7 PG — SIGNIFICANT CHANGE UP (ref 27–34)
MCHC RBC-ENTMCNC: 30.7 GM/DL — LOW (ref 32–36)
MCHC RBC-ENTMCNC: 30.8 GM/DL — LOW (ref 32–36)
MCV RBC AUTO: 93.1 FL — SIGNIFICANT CHANGE UP (ref 80–100)
MCV RBC AUTO: 93.2 FL — SIGNIFICANT CHANGE UP (ref 80–100)
MONOCYTES # BLD AUTO: 0.47 K/UL — SIGNIFICANT CHANGE UP (ref 0–0.9)
MONOCYTES NFR BLD AUTO: 8.5 % — SIGNIFICANT CHANGE UP (ref 2–14)
NEUTROPHILS # BLD AUTO: 4.15 K/UL — SIGNIFICANT CHANGE UP (ref 1.8–7.4)
NEUTROPHILS NFR BLD AUTO: 75 % — SIGNIFICANT CHANGE UP (ref 43–77)
NITRITE UR-MCNC: NEGATIVE — SIGNIFICANT CHANGE UP
NRBC # BLD: 0 /100 WBCS — SIGNIFICANT CHANGE UP (ref 0–0)
NRBC # BLD: 0 /100 WBCS — SIGNIFICANT CHANGE UP (ref 0–0)
PH UR: 6 — SIGNIFICANT CHANGE UP (ref 5–8)
PLATELET # BLD AUTO: 258 K/UL — SIGNIFICANT CHANGE UP (ref 150–400)
PLATELET # BLD AUTO: 271 K/UL — SIGNIFICANT CHANGE UP (ref 150–400)
POTASSIUM SERPL-MCNC: 3.6 MMOL/L — SIGNIFICANT CHANGE UP (ref 3.5–5.3)
POTASSIUM SERPL-MCNC: 4 MMOL/L — SIGNIFICANT CHANGE UP (ref 3.5–5.3)
POTASSIUM SERPL-SCNC: 3.6 MMOL/L — SIGNIFICANT CHANGE UP (ref 3.5–5.3)
POTASSIUM SERPL-SCNC: 4 MMOL/L — SIGNIFICANT CHANGE UP (ref 3.5–5.3)
PROT SERPL-MCNC: 7.1 G/DL — SIGNIFICANT CHANGE UP (ref 6–8.3)
PROT UR-MCNC: 15
RBC # BLD: 4.2 M/UL — SIGNIFICANT CHANGE UP (ref 3.8–5.2)
RBC # BLD: 4.25 M/UL — SIGNIFICANT CHANGE UP (ref 3.8–5.2)
RBC # FLD: 14.2 % — SIGNIFICANT CHANGE UP (ref 10.3–14.5)
RBC # FLD: 14.3 % — SIGNIFICANT CHANGE UP (ref 10.3–14.5)
RBC CASTS # UR COMP ASSIST: SIGNIFICANT CHANGE UP /HPF (ref 0–2)
SODIUM SERPL-SCNC: 137 MMOL/L — SIGNIFICANT CHANGE UP (ref 135–145)
SODIUM SERPL-SCNC: 138 MMOL/L — SIGNIFICANT CHANGE UP (ref 135–145)
SP GR SPEC: 1.01 — SIGNIFICANT CHANGE UP (ref 1.01–1.02)
TOTAL CHOLESTEROL/HDL RATIO MEASUREMENT: 3 RATIO — LOW (ref 3.3–7.1)
TRIGL SERPL-MCNC: 138 MG/DL — SIGNIFICANT CHANGE UP (ref 10–149)
TSH SERPL-MCNC: 2.03 UU/ML — SIGNIFICANT CHANGE UP (ref 0.34–4.82)
UROBILINOGEN FLD QL: NEGATIVE — SIGNIFICANT CHANGE UP
VIT B12 SERPL-MCNC: 326 PG/ML — SIGNIFICANT CHANGE UP (ref 232–1245)
WBC # BLD: 5.53 K/UL — SIGNIFICANT CHANGE UP (ref 3.8–10.5)
WBC # BLD: 5.91 K/UL — SIGNIFICANT CHANGE UP (ref 3.8–10.5)
WBC # FLD AUTO: 5.53 K/UL — SIGNIFICANT CHANGE UP (ref 3.8–10.5)
WBC # FLD AUTO: 5.91 K/UL — SIGNIFICANT CHANGE UP (ref 3.8–10.5)
WBC UR QL: ABNORMAL /HPF (ref 0–5)

## 2019-08-13 PROCEDURE — 93923 UPR/LXTR ART STDY 3+ LVLS: CPT | Mod: 26

## 2019-08-13 RX ORDER — ATORVASTATIN CALCIUM 80 MG/1
20 TABLET, FILM COATED ORAL AT BEDTIME
Refills: 0 | Status: DISCONTINUED | OUTPATIENT
Start: 2019-08-13 | End: 2019-08-15

## 2019-08-13 RX ORDER — FUROSEMIDE 40 MG
40 TABLET ORAL DAILY
Refills: 0 | Status: DISCONTINUED | OUTPATIENT
Start: 2019-08-13 | End: 2019-08-15

## 2019-08-13 RX ORDER — CEFAZOLIN SODIUM 1 G
1000 VIAL (EA) INJECTION EVERY 8 HOURS
Refills: 0 | Status: DISCONTINUED | OUTPATIENT
Start: 2019-08-13 | End: 2019-08-14

## 2019-08-13 RX ORDER — LISINOPRIL 2.5 MG/1
1 TABLET ORAL
Qty: 0 | Refills: 0 | DISCHARGE

## 2019-08-13 RX ORDER — INSULIN LISPRO 100/ML
4 VIAL (ML) SUBCUTANEOUS ONCE
Refills: 0 | Status: COMPLETED | OUTPATIENT
Start: 2019-08-13 | End: 2019-08-13

## 2019-08-13 RX ORDER — LANOLIN ALCOHOL/MO/W.PET/CERES
3 CREAM (GRAM) TOPICAL AT BEDTIME
Refills: 0 | Status: DISCONTINUED | OUTPATIENT
Start: 2019-08-13 | End: 2019-08-15

## 2019-08-13 RX ORDER — FUROSEMIDE 40 MG
1 TABLET ORAL
Qty: 0 | Refills: 0 | DISCHARGE

## 2019-08-13 RX ORDER — LISINOPRIL 2.5 MG/1
20 TABLET ORAL DAILY
Refills: 0 | Status: DISCONTINUED | OUTPATIENT
Start: 2019-08-13 | End: 2019-08-15

## 2019-08-13 RX ADMIN — Medication 1 APPLICATION(S): at 13:49

## 2019-08-13 RX ADMIN — HEPARIN SODIUM 5000 UNIT(S): 5000 INJECTION INTRAVENOUS; SUBCUTANEOUS at 13:49

## 2019-08-13 RX ADMIN — Medication 4 UNIT(S): at 22:31

## 2019-08-13 RX ADMIN — Medication 50 MILLIGRAM(S): at 05:41

## 2019-08-13 RX ADMIN — AMPICILLIN SODIUM AND SULBACTAM SODIUM 100 GRAM(S): 250; 125 INJECTION, POWDER, FOR SUSPENSION INTRAMUSCULAR; INTRAVENOUS at 01:45

## 2019-08-13 RX ADMIN — Medication 3 MILLIGRAM(S): at 22:16

## 2019-08-13 RX ADMIN — Medication 100 MILLIGRAM(S): at 13:50

## 2019-08-13 RX ADMIN — HEPARIN SODIUM 5000 UNIT(S): 5000 INJECTION INTRAVENOUS; SUBCUTANEOUS at 22:16

## 2019-08-13 RX ADMIN — Medication 3: at 11:47

## 2019-08-13 RX ADMIN — AMPICILLIN SODIUM AND SULBACTAM SODIUM 100 GRAM(S): 250; 125 INJECTION, POWDER, FOR SUSPENSION INTRAMUSCULAR; INTRAVENOUS at 05:41

## 2019-08-13 RX ADMIN — HEPARIN SODIUM 5000 UNIT(S): 5000 INJECTION INTRAVENOUS; SUBCUTANEOUS at 05:41

## 2019-08-13 RX ADMIN — INSULIN GLARGINE 10 UNIT(S): 100 INJECTION, SOLUTION SUBCUTANEOUS at 22:16

## 2019-08-13 NOTE — PROGRESS NOTE ADULT - PROBLEM SELECTOR PLAN 1
c/w local wound care, s/p 1 dose of vancomycin, c/w unasyn for now  f/u podiatry consult c/w local wound care,   s/p 1 dose of vancomycin,   c/w unasyn for now  podiatry consulted  GABRIEL TEJADA c/w local wound care,   s/p 1 dose of vancomycin,   changed unasyn to cefazolin as pharmacy does not have unasyn  Xray tibia/fibula negative  podiatry consulted  FU TERRELL  ID Dr. Murrell consulted

## 2019-08-13 NOTE — CONSULT NOTE ADULT - SUBJECTIVE AND OBJECTIVE BOX
Patient is a 83y old  Female who presents with a chief complaint of left leg ulcer (13 Aug 2019 11:31)      INTERVAL HPI/OVERNIGHT EVENTS:        PAST MEDICAL & SURGICAL HISTORY:  Diabetes mellitus  Status post cataract extraction: right eye done in 2017  History of colonoscopy  Status post colectomy      REVIEW OF SYSTEMS: Total of twelve systems have been reviewed with patient and found to be negative unless mentioned in HPI      SOCIAL HISTORY  Alcohol: Does not drink  Tobacco: Does not smoke  Illicit substance use: None      FAMILY HISTORY: Non contributory to the present illness        No Known Drug Allergies  shellfish (Anaphylaxis)        T(C): 36.6 (19 @ 14:18), Max: 36.7 (19 @ 05:20)  HR: 63 (19 @ 14:18) (59 - 90)  BP: 149/60 (19 @ 14:18) (109/63 - 173/88)  RR: 18 (19 @ 14:18) (16 - 18)  SpO2: 97% (19 @ 14:18) (97% - 100%)        PHYSICAL EXAM:  GENERAL: Not in distress   CHEST/LUNG:  Aire ntry bilaterally  HEART: s1 and s2 present  ABDOMEN:  Nontender and  Nondistended  EXTREMITIES: No pedal  edema  CNS: Awake and Alert      LABS:                        12.2   5.53  )-----------( 271      ( 13 Aug 2019 13:04 )             39.6     08-    137  |  102  |  25<H>  ----------------------------<  194<H>  4.0   |  31  |  1.03    Ca    9.2      13 Aug 2019 13:04  Mg     2.0     -    TPro  7.1  /  Alb  2.9<L>  /  TBili  0.4  /  DBili  x   /  AST  23  /  ALT  21  /  AlkPhos  109  08      Urinalysis Basic - ( 13 Aug 2019 11:40 )    Color: Yellow / Appearance: Clear / S.010 / pH: x  Gluc: x / Ketone: Negative  / Bili: Negative / Urobili: Negative   Blood: x / Protein: 15 / Nitrite: Negative   Leuk Esterase: Trace / RBC: x / WBC 11-25 /HPF   Sq Epi: x / Non Sq Epi: x / Bacteria: x      CAPILLARY BLOOD GLUCOSE      POCT Blood Glucose.: 266 mg/dL (13 Aug 2019 11:38)  POCT Blood Glucose.: 149 mg/dL (13 Aug 2019 08:05)  POCT Blood Glucose.: 118 mg/dL (12 Aug 2019 22:56)        Urinalysis Basic - ( 13 Aug 2019 11:40 )    Color: Yellow / Appearance: Clear / S.010 / pH: x  Gluc: x / Ketone: Negative  / Bili: Negative / Urobili: Negative   Blood: x / Protein: 15 / Nitrite: Negative   Leuk Esterase: Trace / RBC: x / WBC 11-25 /HPF   Sq Epi: x / Non Sq Epi: x / Bacteria: x        MEDICATIONS  (STANDING):  atorvastatin 20 milliGRAM(s) Oral at bedtime  ceFAZolin   IVPB 1000 milliGRAM(s) IV Intermittent every 8 hours  furosemide    Tablet 40 milliGRAM(s) Oral daily  heparin  Injectable 5000 Unit(s) SubCutaneous every 8 hours  insulin glargine Injectable (LANTUS) 10 Unit(s) SubCutaneous at bedtime  insulin lispro (HumaLOG) corrective regimen sliding scale   SubCutaneous three times a day before meals  lisinopril 20 milliGRAM(s) Oral daily    MEDICATIONS  (PRN):          RADIOLOGY & ADDITIONAL TESTS: Patient is a 83y old  Female ambulates with walker, and PMH of DM, OA, colon cancer in the past s/p remission, presents to the ER for evaluation of  3-4 days of severe bilateral LE edema with skin break in the left, 7 cm x 7cm circular ulcer anterior aspect on the shin. The pain began bilaterally with copious discharge fluids, placed an ACE bandage that was soaked through. She has no fever or chills. She has evaluated by Podiatry and started on Cefazolin. The ID consult requested to assist with further evaluation and antibiotic management.         REVIEW OF SYSTEMS: Total of twelve systems have been reviewed with patient and found to be negative unless mentioned in HPI        PAST MEDICAL & SURGICAL HISTORY:  Diabetes mellitus  Status post cataract extraction: right eye done in 2017  History of colonoscopy  Status post colectomy        SOCIAL HISTORY  Alcohol: Does not drink  Tobacco: Does not smoke  Illicit substance use: None      FAMILY HISTORY: Non contributory to the present illness        ALLERGIES: No Known Drug Allergies  shellfish (Anaphylaxis)        T(C): 36.6 (19 @ 14:18), Max: 36.7 (19 @ 05:20)  HR: 63 (19 @ 14:18) (59 - 90)  BP: 149/60 (19 @ 14:18) (109/63 - 173/88)  RR: 18 (19 @ 14:18) (16 - 18)  SpO2: 97% (19 @ 14:18) (97% - 100%)        PHYSICAL EXAM:  GENERAL: Not in distress   CHEST/LUNG:  Air entry bilaterally  HEART: s1 and s2 present  ABDOMEN:  Nontender and  Nondistended  EXTREMITIES: Left anterior aspect on the shin has a superficial ulcer and mild redness but not warm to touch  CNS: Awake and Alert          LABS:                        12.2   5.53  )-----------( 271      ( 13 Aug 2019 13:04 )             39.6         08-    137  |  102  |  25<H>  ----------------------------<  194<H>  4.0   |  31  |  1.03    Ca    9.2      13 Aug 2019 13:04  Mg     2.0     -    TPro  7.1  /  Alb  2.9<L>  /  TBili  0.4  /  DBili  x   /  AST  23  /  ALT  21  /  AlkPhos  109  08-13        Urinalysis Basic - ( 13 Aug 2019 11:40 )  Color: Yellow / Appearance: Clear / S.010 / pH: x  Gluc: x / Ketone: Negative  / Bili: Negative / Urobili: Negative   Blood: x / Protein: 15 / Nitrite: Negative   Leuk Esterase: Trace / RBC: x / WBC 11-25 /HPF   Sq Epi: x / Non Sq Epi: x / Bacteria: x      CAPILLARY BLOOD GLUCOSE  POCT Blood Glucose.: 266 mg/dL (13 Aug 2019 11:38)  POCT Blood Glucose.: 149 mg/dL (13 Aug 2019 08:05)  POCT Blood Glucose.: 118 mg/dL (12 Aug 2019 22:56)          MEDICATIONS  (STANDING):  atorvastatin 20 milliGRAM(s) Oral at bedtime  ceFAZolin   IVPB 1000 milliGRAM(s) IV Intermittent every 8 hours  furosemide    Tablet 40 milliGRAM(s) Oral daily  heparin  Injectable 5000 Unit(s) SubCutaneous every 8 hours  insulin glargine Injectable (LANTUS) 10 Unit(s) SubCutaneous at bedtime  insulin lispro (HumaLOG) corrective regimen sliding scale   SubCutaneous three times a day before meals  lisinopril 20 milliGRAM(s) Oral daily    MEDICATIONS  (PRN):        RADIOLOGY & ADDITIONAL TESTS:    19 : Xray Tibia + Fibula 2 Views, Left (19 @ 23:09) No acute bony pathology. Note - This does not exclude fractures in perfect alignment and  apposition as presence may be indicated at one to three weeks following  callus formation.

## 2019-08-13 NOTE — CONSULT NOTE ADULT - ASSESSMENT
Patient is a 83y old  Female ambulates with walker, and PMH of DM, OA, colon cancer in the past s/p remission, presents to the ER for evaluation of  3-4 days of severe bilateral LE edema with skin break in the left, 7 cm x 7cm circular ulcer anterior aspect on the shin. The pain began bilaterally with copious discharge fluids, placed an ACE bandage that was soaked through. She has no fever or chills. She has evaluated by Podiatry and started on Cefazolin. The ID consult requested to assist with further evaluation and antibiotic management.     # Left Leg superficial ulcer    would recommend:    1. Keep Left Leg elevated   2. Please discontinue Cefazolin since Left keg superficial ulcer does not appear infected   3. Continue  Dressing change   4. Skin Swab culture not recommended     will follow the patient with you and make further recommendation based on the clinical course and Lab results  Thank you for the opportunity to participate in Ms. WILLETT's care

## 2019-08-13 NOTE — PROGRESS NOTE ADULT - PROBLEM SELECTOR PLAN 2
pt has h/o varicose veins  on examination has pedal edema which is present since long since long and has been using stockings pt has h/o varicose veins  on examination has pedal edema which is present since long and has been using stockings

## 2019-08-13 NOTE — PROGRESS NOTE ADULT - SUBJECTIVE AND OBJECTIVE BOX
PGY 2 Note discussed with supervising resident and primary attending    Patient is a 83y old  Female who presents with a chief complaint of left leg ulcer (13 Aug 2019 06:44)      INTERVAL HPI/OVERNIGHT EVENTS: offers no new complaints; current symptoms resolving    MEDICATIONS  (STANDING):  ampicillin/sulbactam  IVPB 1.5 Gram(s) IV Intermittent every 6 hours  heparin  Injectable 5000 Unit(s) SubCutaneous every 8 hours  insulin glargine Injectable (LANTUS) 10 Unit(s) SubCutaneous at bedtime  insulin lispro (HumaLOG) corrective regimen sliding scale   SubCutaneous three times a day before meals  metoprolol tartrate 50 milliGRAM(s) Oral two times a day  povidone iodine 10% Solution 1 Application(s) Topical daily    MEDICATIONS  (PRN):      __________________________________________________  REVIEW OF SYSTEMS:    CONSTITUTIONAL: No fever,   EYES: no acute visual disturbances  NECK: No pain or stiffness  RESPIRATORY: No cough; No shortness of breath  CARDIOVASCULAR: No chest pain, no palpitations  GASTROINTESTINAL: No pain. No nausea or vomiting; No diarrhea   NEUROLOGICAL: No headache or numbness, no tremors  MUSCULOSKELETAL: No joint pain, no muscle pain  GENITOURINARY: no dysuria, no frequency, no hesitancy  PSYCHIATRY: no depression , no anxiety  ALL OTHER  ROS negative        Vital Signs Last 24 Hrs  T(C): 36.7 (13 Aug 2019 05:20), Max: 36.8 (12 Aug 2019 15:20)  T(F): 98.1 (13 Aug 2019 05:20), Max: 98.2 (12 Aug 2019 15:20)  HR: 74 (13 Aug 2019 05:20) (59 - 99)  BP: 124/74 (13 Aug 2019 05:20) (124/74 - 173/88)  BP(mean): --  RR: 16 (13 Aug 2019 05:20) (16 - 18)  SpO2: 97% (13 Aug 2019 05:20) (97% - 100%)    ________________________________________________  PHYSICAL EXAM:  GENERAL: NAD  HEENT: Normocephalic;  conjunctivae and sclerae clear; moist mucous membranes;   NECK : supple  CHEST/LUNG: Clear to auscultation bilaterally with good air entry   HEART: S1 S2  regular; no murmurs, gallops or rubs  ABDOMEN: Soft, Nontender, Nondistended; Bowel sounds present  EXTREMITIES: no cyanosis; no edema; no calf tenderness, B/L LE covered with bandage  SKIN: warm and dry; no rash  NERVOUS SYSTEM:  Awake and alert; Oriented  to place, person and time ; no new deficits    _________________________________________________  LABS:                        12.0   5.91  )-----------( 258      ( 13 Aug 2019 09:14 )             39.1     08-13    138  |  104  |  25<H>  ----------------------------<  114<H>  3.6   |  31  |  0.97    Ca    9.3      13 Aug 2019 09:14  Mg     2.0     -12    TPro  7.0  /  Alb  3.3<L>  /  TBili  0.4  /  DBili  x   /  AST  20  /  ALT  22  /  AlkPhos  99  08-12      Urinalysis Basic - ( 12 Aug 2019 18:02 )    Color: Yellow / Appearance: Clear / S.015 / pH: x  Gluc: x / Ketone: Negative  / Bili: Negative / Urobili: Negative   Blood: x / Protein: 30 mg/dL / Nitrite: Positive   Leuk Esterase: Small / RBC: 2-5 /HPF / WBC 6-10 /HPF   Sq Epi: x / Non Sq Epi: Few /HPF / Bacteria: Many /HPF      CAPILLARY BLOOD GLUCOSE      POCT Blood Glucose.: 149 mg/dL (13 Aug 2019 08:05)  POCT Blood Glucose.: 118 mg/dL (12 Aug 2019 22:56)        RADIOLOGY & ADDITIONAL TESTS:    Imaging Personally Reviewed:  YES    Consultant(s) Notes Reviewed:  YES    Care Discussed with Consultants : YES    Plan of care was discussed with patient and /or primary care giver; all questions and concerns were addressed and care was aligned with patient's wishes.

## 2019-08-13 NOTE — PROGRESS NOTE ADULT - SUBJECTIVE AND OBJECTIVE BOX
S : 83y year old Female seen at bedside for Left leg ulceration.  Patient relates to having the ulceration for few days . Pt states that it started with a blister which she thought was caused by bug bite but then started growing big and started draining lot of fluid and since yesterday she noticed her left leg and foot turned red and painful.  Chief Complaint : Patient is a 83y old  Female who presents with a chief complaint of left leg ulcer (12 Aug 2019 19:39)    HPI : HPI:  82 yo F, ambulates with walker, with PMH of DM, OA, colon cancer in the past s/p remission presents to the ED with with complaints of 3-4d of severe bilateral LE edema with skin break in the left, 7 cm x 7cm circular ulcer anterior aspect on the shin. Patient lives alone, uses insulin, and see the doctor for her cataracts. Patient states denies hx of heart failure, coronary artery disease. Patient reports that the pain began bilaterally with copious discharge fluids, placed an ACE bandage that was soaked through. Patient has a large ulcer in the shin that has been presents for 3-4d. Pt is a poor historian, Patient is accompanied by niece and nephew, sent to the ED by them to get it checked, also concerned regarding pt's ability to take care of herself at home, requesting possible HHA. Patient denies any fever, PINEDO, chest pain, or any other complain (12 Aug 2019 19:39)  pt seen by podiatry today resting comfortbly in bed and legs elevated.      Patient admits to  (-) Fevers, (-) Chills, (-) Nausea, (-) Vomiting, (-) Shortness of Breath      PMH: Diabetes mellitus  DM (diabetes mellitus)  HLD (hyperlipidemia)  HTN (hypertension)  Glaucoma  Carotid stenosis  HLD (hyperlipidemia)  Hypertension    PSH:Status post cataract extraction  History of colonoscopy  Status post colectomy  Type 2 diabetes mellitus  Carotid stenosis  Glaucoma  HLD (hyperlipidemia)  Hypertension      Allergies:No Known Drug Allergies  shellfish (Anaphylaxis)      Labs:                          11.2   4.76  )-----------( 228      ( 12 Aug 2019 16:16 )             36.2     WBC Trend  4.76 Date (08-12 @ 16:16)      Chem  08-12    141  |  108  |  27<H>  ----------------------------<  180<H>  4.0   |  29  |  1.08    Ca    9.3      12 Aug 2019 16:16  Mg     2.0     08-12    TPro  7.0  /  Alb  3.3<L>  /  TBili  0.4  /  DBili  x   /  AST  20  /  ALT  22  /  AlkPhos  99  08-12          T(F): 97.4 (08-12-19 @ 20:28), Max: 98.2 (08-12-19 @ 15:20)  HR: 89 (08-12-19 @ 20:28) (89 - 99)  BP: 170/74 (08-12-19 @ 20:28) (146/87 - 173/88)  RR: 17 (08-12-19 @ 20:28) (16 - 18)  SpO2: 99% (08-12-19 @ 20:28) (98% - 99%)  Wt(kg): --    O:   General: Pleasant  female NAD & AOX3.    Integument:  Skin warm, dry and supple bilateral.    Ulceration Left anterior leg superficial in nature, - hyperkeratotic border, wound base Granular , wound size (4 cm X 4cm X 0.1cm) +4 leg edema, +aditya-wound erythema, - purulence, - fluctuance,   - tracking/tunneling, - probe to bone.   Vascular: Dorsalis Pedis and Posterior Tibial pulses 1/4.  Capillary re-fill time less then 5 seconds digits 1-5 bilateral.    Neuro: Protective sensation intact to the level of the digits bilateral.  MSK: Muscle strength 4/5 all major muscle groups bilateral.  Deformity:  A: Left anterior leg ulceration with leg edema      P:   Chart reviewed and Patient evaluated  Discussed diagnosis and treatment with patient  Wound flush with normal saline  wound culture pending  Applied betadine with dry sterile dressing with multilayer compression,  X-rays reviewed insignificant  Continue with IV antibiotics As Per ID  Ordered TERRELL  Weight bearing as tolerated  Offloading to bilateral Heels and leg elevation   Podiatry will follow while in house.  Discussed with Attending Dr Amaro

## 2019-08-13 NOTE — PROGRESS NOTE ADULT - PROBLEM SELECTOR PLAN 4
Patient was found to have high blood pressure on admission.   Metoprolol was started.  monitor Bp and optimize medications accordingly.  Patient said she doesn't take any medication for Your blood pressure was well-controlled during your admission. Continue with your current regimen of anti-hypertensive medications as mentioned in your discharge summary and ensure that you follow up with your primary care provider for further recommendations and monitoring. at home Patient was found to have high blood pressure on admission.   Metoprolol was started.  monitor Bp and optimize medications accordingly. started home meds

## 2019-08-13 NOTE — PROGRESS NOTE ADULT - SUBJECTIVE AND OBJECTIVE BOX
Patient was seen and examined  Patient is a 83y old  Female who presents with a chief complaint of left leg ulcer (12 Aug 2019 21:06)      INTERVAL HPI/OVERNIGHT EVENTS:  T(C): 36.7 (19 @ 05:20), Max: 36.8 (19 @ 15:20)  HR: 74 (19 @ 05:20) (59 - 99)  BP: 124/74 (19 @ 05:20) (124/74 - 173/88)  RR: 16 (19 @ 05:20) (16 - 18)  SpO2: 97% (19 @ 05:20) (97% - 100%)  Wt(kg): --  I&O's Summary      LABS:                        11.2   4.76  )-----------( 228      ( 12 Aug 2019 16:16 )             36.2         141  |  108  |  27<H>  ----------------------------<  180<H>  4.0   |  29  |  1.08    Ca    9.3      12 Aug 2019 16:16  Mg     2.0         TPro  7.0  /  Alb  3.3<L>  /  TBili  0.4  /  DBili  x   /  AST  20  /  ALT  22  /  AlkPhos  99  08-12      Urinalysis Basic - ( 12 Aug 2019 18:02 )    Color: Yellow / Appearance: Clear / S.015 / pH: x  Gluc: x / Ketone: Negative  / Bili: Negative / Urobili: Negative   Blood: x / Protein: 30 mg/dL / Nitrite: Positive   Leuk Esterase: Small / RBC: 2-5 /HPF / WBC 6-10 /HPF   Sq Epi: x / Non Sq Epi: Few /HPF / Bacteria: Many /HPF      CAPILLARY BLOOD GLUCOSE      POCT Blood Glucose.: 118 mg/dL (12 Aug 2019 22:56)          Urinalysis Basic - ( 12 Aug 2019 18:02 )    Color: Yellow / Appearance: Clear / S.015 / pH: x  Gluc: x / Ketone: Negative  / Bili: Negative / Urobili: Negative   Blood: x / Protein: 30 mg/dL / Nitrite: Positive   Leuk Esterase: Small / RBC: 2-5 /HPF / WBC 6-10 /HPF   Sq Epi: x / Non Sq Epi: Few /HPF / Bacteria: Many /HPF        MEDICATIONS  (STANDING):  ampicillin/sulbactam  IVPB 1.5 Gram(s) IV Intermittent every 6 hours  heparin  Injectable 5000 Unit(s) SubCutaneous every 8 hours  insulin glargine Injectable (LANTUS) 10 Unit(s) SubCutaneous at bedtime  insulin lispro (HumaLOG) corrective regimen sliding scale   SubCutaneous three times a day before meals  metoprolol tartrate 50 milliGRAM(s) Oral two times a day  povidone iodine 10% Solution 1 Application(s) Topical daily    MEDICATIONS  (PRN):      RADIOLOGY & ADDITIONAL TESTS:    Imaging Personally Reviewed:  [ ] YES  [ ] NO    REVIEW OF SYSTEMS:  CONSTITUTIONAL: No fever, weight loss, or fatigue  EYES: No eye pain, visual disturbances, or discharge  ENMT:  No difficulty hearing, tinnitus, vertigo; No sinus or throat pain  NECK: No pain or stiffness  BREASTS: No pain, masses, or nipple discharge  RESPIRATORY: No cough, wheezing, chills or hemoptysis; No shortness of breath  CARDIOVASCULAR: No chest pain, palpitations, dizziness, or leg swelling  GASTROINTESTINAL: No abdominal or epigastric pain. No nausea, vomiting, or hematemesis; No diarrhea or constipation. No melena or hematochezia.  GENITOURINARY: No dysuria, frequency, hematuria, or incontinence  NEUROLOGICAL: No headaches, memory loss, loss of strength, numbness, or tremors  SKIN: No itching, burning, rashes, or lesions   LYMPH NODES: No enlarged glands  ENDOCRINE: No heat or cold intolerance; No hair loss  MUSCULOSKELETAL: No joint pain or swelling; No muscle, back, or extremity pain  PSYCHIATRIC: No depression, anxiety, mood swings, or difficulty sleeping  HEME/LYMPH: No easy bruising, or bleeding gums  ALLERY AND IMMUNOLOGIC: No hives or eczema      Consultant(s) Notes Reviewed:  [ ] YES  [ ] NO    PHYSICAL EXAM:  GENERAL: NAD, well-groomed, well-developed  HEAD:  Atraumatic, Normocephalic  EYES: EOMI, PERRLA, conjunctiva and sclera clear  ENMT: No tonsillar erythema, exudates, or enlargement; Moist mucous membranes, Good dentition, No lesions  NECK: Supple, No JVD, Normal thyroid  NERVOUS SYSTEM:  Alert & Oriented X3, Good concentration; Motor Strength 5/5 B/L upper and lower extremities; DTRs 2+ intact and symmetric  CHEST/LUNG: Clear to percussion bilaterally; No rales, rhonchi, wheezing, or rubs  HEART: Regular rate and rhythm; No murmurs, rubs, or gallops  ABDOMEN: Soft, Nontender, Nondistended; Bowel sounds present  EXTREMITIES: left le dressing eryhthema   LYMPH: No lymphadenopathy noted  SKIN: No rashes or lesions    Care Discussed with Consultants/Other Providers [ x] YES  [ ] NO

## 2019-08-14 DIAGNOSIS — E55.9 VITAMIN D DEFICIENCY, UNSPECIFIED: ICD-10-CM

## 2019-08-14 LAB
24R-OH-CALCIDIOL SERPL-MCNC: 20.6 NG/ML — LOW (ref 30–80)
CULTURE RESULTS: SIGNIFICANT CHANGE UP
GLUCOSE BLDC GLUCOMTR-MCNC: 108 MG/DL — HIGH (ref 70–99)
GLUCOSE BLDC GLUCOMTR-MCNC: 136 MG/DL — HIGH (ref 70–99)
GLUCOSE BLDC GLUCOMTR-MCNC: 225 MG/DL — HIGH (ref 70–99)
GLUCOSE BLDC GLUCOMTR-MCNC: 357 MG/DL — HIGH (ref 70–99)
SPECIMEN SOURCE: SIGNIFICANT CHANGE UP

## 2019-08-14 RX ORDER — INSULIN LISPRO 100/ML
5 VIAL (ML) SUBCUTANEOUS
Refills: 0 | Status: DISCONTINUED | OUTPATIENT
Start: 2019-08-14 | End: 2019-08-15

## 2019-08-14 RX ORDER — NYSTATIN CREAM 100000 [USP'U]/G
1 CREAM TOPICAL THREE TIMES A DAY
Refills: 0 | Status: DISCONTINUED | OUTPATIENT
Start: 2019-08-14 | End: 2019-08-14

## 2019-08-14 RX ORDER — INSULIN LISPRO 100/ML
2 VIAL (ML) SUBCUTANEOUS ONCE
Refills: 0 | Status: COMPLETED | OUTPATIENT
Start: 2019-08-14 | End: 2019-08-14

## 2019-08-14 RX ORDER — ERGOCALCIFEROL 1.25 MG/1
1 CAPSULE ORAL
Qty: 8 | Refills: 0
Start: 2019-08-14 | End: 2019-10-08

## 2019-08-14 RX ORDER — ERGOCALCIFEROL 1.25 MG/1
50000 CAPSULE ORAL
Refills: 0 | Status: DISCONTINUED | OUTPATIENT
Start: 2019-08-14 | End: 2019-08-15

## 2019-08-14 RX ADMIN — ERGOCALCIFEROL 50000 UNIT(S): 1.25 CAPSULE ORAL at 12:13

## 2019-08-14 RX ADMIN — Medication 5: at 12:14

## 2019-08-14 RX ADMIN — Medication 100 MILLIGRAM(S): at 00:13

## 2019-08-14 RX ADMIN — ATORVASTATIN CALCIUM 20 MILLIGRAM(S): 80 TABLET, FILM COATED ORAL at 21:54

## 2019-08-14 RX ADMIN — HEPARIN SODIUM 5000 UNIT(S): 5000 INJECTION INTRAVENOUS; SUBCUTANEOUS at 05:44

## 2019-08-14 RX ADMIN — Medication 2 UNIT(S): at 22:22

## 2019-08-14 RX ADMIN — Medication 3 MILLIGRAM(S): at 21:54

## 2019-08-14 RX ADMIN — HEPARIN SODIUM 5000 UNIT(S): 5000 INJECTION INTRAVENOUS; SUBCUTANEOUS at 21:57

## 2019-08-14 RX ADMIN — INSULIN GLARGINE 10 UNIT(S): 100 INJECTION, SOLUTION SUBCUTANEOUS at 21:56

## 2019-08-14 RX ADMIN — LISINOPRIL 20 MILLIGRAM(S): 2.5 TABLET ORAL at 05:44

## 2019-08-14 RX ADMIN — Medication 40 MILLIGRAM(S): at 05:44

## 2019-08-14 RX ADMIN — Medication 5 UNIT(S): at 17:30

## 2019-08-14 RX ADMIN — Medication 5 UNIT(S): at 12:14

## 2019-08-14 NOTE — PHYSICAL THERAPY INITIAL EVALUATION ADULT - IMPAIRMENTS FOUND, PT EVAL
gross motor/aerobic capacity/endurance/muscle strength/posture/poor safety awareness/gait, locomotion, and balance

## 2019-08-14 NOTE — PROGRESS NOTE ADULT - SUBJECTIVE AND OBJECTIVE BOX
Patient is seen and examined at the bed side, is afebrile.      REVIEW OF SYSTEMS: All other review systems are negative        ALLERGIES: No Known Drug Allergies  shellfish (Anaphylaxis)      Vital Signs Last 24 Hrs  T(C): 36.8 (14 Aug 2019 14:00), Max: 36.8 (14 Aug 2019 14:00)  T(F): 98.2 (14 Aug 2019 14:00), Max: 98.2 (14 Aug 2019 14:00)  HR: 79 (14 Aug 2019 14:00) (62 - 86)  BP: 104/64 (14 Aug 2019 14:00) (104/64 - 126/67)  BP(mean): --  RR: 16 (14 Aug 2019 14:00) (16 - 18)  SpO2: 99% (14 Aug 2019 14:00) (98% - 100%)        PHYSICAL EXAM:  GENERAL: Not in distress   CHEST/LUNG:  Air entry bilaterally  HEART: s1 and s2 present  ABDOMEN:  Nontender and  Nondistended  EXTREMITIES: Left anterior aspect on the shin has a superficial ulcer and mild redness but not warm to touch  CNS: Awake and Alert          LABS: No new Labs                          12.2   5.53  )-----------( 271      ( 13 Aug 2019 13:04 )             39.6         08-13    137  |  102  |  25<H>  ----------------------------<  194<H>  4.0   |  31  |  1.03    Ca    9.2      13 Aug 2019 13:04    TPro  7.1  /  Alb  2.9<L>  /  TBili  0.4  /  DBili  x   /  AST  23  /  ALT  21  /  AlkPhos  109  08-13        Urinalysis Basic - ( 13 Aug 2019 11:40 )  Color: Yellow / Appearance: Clear / S.010 / pH: x  Gluc: x / Ketone: Negative  / Bili: Negative / Urobili: Negative   Blood: x / Protein: 15 / Nitrite: Negative   Leuk Esterase: Trace / RBC: x / WBC 11-25 /HPF   Sq Epi: x / Non Sq Epi: x / Bacteria: x      CAPILLARY BLOOD GLUCOSE  POCT Blood Glucose.: 266 mg/dL (13 Aug 2019 11:38)  POCT Blood Glucose.: 149 mg/dL (13 Aug 2019 08:05)  POCT Blood Glucose.: 118 mg/dL (12 Aug 2019 22:56)          MEDICATIONS  (STANDING):  atorvastatin 20 milliGRAM(s) Oral at bedtime  ergocalciferol 65238 Unit(s) Oral <User Schedule>  furosemide    Tablet 40 milliGRAM(s) Oral daily  heparin  Injectable 5000 Unit(s) SubCutaneous every 8 hours  insulin glargine Injectable (LANTUS) 10 Unit(s) SubCutaneous at bedtime  insulin lispro (HumaLOG) corrective regimen sliding scale   SubCutaneous three times a day before meals  insulin lispro Injectable (HumaLOG) 5 Unit(s) SubCutaneous three times a day before meals  lisinopril 20 milliGRAM(s) Oral daily  melatonin 3 milliGRAM(s) Oral at bedtime  silver sulfADIAZINE 1% Cream 1 Application(s) Topical daily    MEDICATIONS  (PRN):        RADIOLOGY & ADDITIONAL TESTS:    19 : Xray Tibia + Fibula 2 Views, Left (19 @ 23:09) No acute bony pathology. Note - This does not exclude fractures in perfect alignment and  apposition as presence may be indicated at one to three weeks following  callus formation. Patient is seen and examined at the bed side, is afebrile. She is doing better physically, but remains on one to one observation.       REVIEW OF SYSTEMS: All other review systems are negative        ALLERGIES: No Known Drug Allergies  shellfish (Anaphylaxis)      Vital Signs Last 24 Hrs  T(C): 36.8 (14 Aug 2019 14:00), Max: 36.8 (14 Aug 2019 14:00)  T(F): 98.2 (14 Aug 2019 14:00), Max: 98.2 (14 Aug 2019 14:00)  HR: 79 (14 Aug 2019 14:00) (62 - 86)  BP: 104/64 (14 Aug 2019 14:00) (104/64 - 126/67)  BP(mean): --  RR: 16 (14 Aug 2019 14:00) (16 - 18)  SpO2: 99% (14 Aug 2019 14:00) (98% - 100%)        PHYSICAL EXAM:  GENERAL: Not in distress   CHEST/LUNG:  Air entry bilaterally  HEART: s1 and s2 present  ABDOMEN:  Nontender and  Nondistended  EXTREMITIES: Left anterior aspect on the shin has a superficial ulcer and mild redness but not warm to touch  CNS: Awake and Alert          LABS: No new Labs                          12.2   5.53  )-----------( 271      ( 13 Aug 2019 13:04 )             39.6         08-13    137  |  102  |  25<H>  ----------------------------<  194<H>  4.0   |  31  |  1.03    Ca    9.2      13 Aug 2019 13:04    TPro  7.1  /  Alb  2.9<L>  /  TBili  0.4  /  DBili  x   /  AST  23  /  ALT  21  /  AlkPhos  109  08-13        Urinalysis Basic - ( 13 Aug 2019 11:40 )  Color: Yellow / Appearance: Clear / S.010 / pH: x  Gluc: x / Ketone: Negative  / Bili: Negative / Urobili: Negative   Blood: x / Protein: 15 / Nitrite: Negative   Leuk Esterase: Trace / RBC: x / WBC 11-25 /HPF   Sq Epi: x / Non Sq Epi: x / Bacteria: x      CAPILLARY BLOOD GLUCOSE  POCT Blood Glucose.: 266 mg/dL (13 Aug 2019 11:38)  POCT Blood Glucose.: 149 mg/dL (13 Aug 2019 08:05)  POCT Blood Glucose.: 118 mg/dL (12 Aug 2019 22:56)          MEDICATIONS  (STANDING):  atorvastatin 20 milliGRAM(s) Oral at bedtime  ergocalciferol 32632 Unit(s) Oral <User Schedule>  furosemide    Tablet 40 milliGRAM(s) Oral daily  heparin  Injectable 5000 Unit(s) SubCutaneous every 8 hours  insulin glargine Injectable (LANTUS) 10 Unit(s) SubCutaneous at bedtime  insulin lispro (HumaLOG) corrective regimen sliding scale   SubCutaneous three times a day before meals  insulin lispro Injectable (HumaLOG) 5 Unit(s) SubCutaneous three times a day before meals  lisinopril 20 milliGRAM(s) Oral daily  melatonin 3 milliGRAM(s) Oral at bedtime  silver sulfADIAZINE 1% Cream 1 Application(s) Topical daily    MEDICATIONS  (PRN):        RADIOLOGY & ADDITIONAL TESTS:    19 : Xray Tibia + Fibula 2 Views, Left (19 @ 23:09) No acute bony pathology. Note - This does not exclude fractures in perfect alignment and  apposition as presence may be indicated at one to three weeks following  callus formation.

## 2019-08-14 NOTE — PHYSICAL THERAPY INITIAL EVALUATION ADULT - ADDITIONAL COMMENTS
Pt reports she was independent with all mobility prior to admission. Pt reports using SAC most of the time, however has a quad cane and uses it when her leg is bothering her. Pt reports independence with all ADLs, however pt poor historian

## 2019-08-14 NOTE — PHYSICAL THERAPY INITIAL EVALUATION ADULT - GENERAL OBSERVATIONS, REHAB EVAL
Pt received seated in bedside chair, enhanced supervision present, A&Ox3 however confused, NAD, cooperative and agreeable to PT

## 2019-08-14 NOTE — DISCHARGE NOTE PROVIDER - NSDCCPCAREPLAN_GEN_ALL_CORE_FT
PRINCIPAL DISCHARGE DIAGNOSIS  Diagnosis: Superficial ulcer of skin  Assessment and Plan of Treatment: You have superficial ulcer of skin, podiatry was consulted and advised local wound care. TERRELL showed elevated ankle brachial indices compatible with calcified lower extremity arteries, diminished flow within the feet which may be secondary to small vessel disease. Infectious disease Dr. Murrell was consulted and advised no need of medications since it is not infected.      SECONDARY DISCHARGE DIAGNOSES  Diagnosis: Leg edema  Assessment and Plan of Treatment: You have history of varicose veins and continue using stockings.    Diagnosis: DM (diabetes mellitus)  Assessment and Plan of Treatment: Continue with your blood sugar medication. HbAIC was 8.7.  You must maintain a healthy diet that consist of low sugar, low fat, low sodium diet. Exercise frequently if possible. Consider repeating your Hemoglobin A1c within 3 months after discharge to monitor your average blood glucose control. Follow up with primary care physician in one week after discharge.    Diagnosis: HLD (hyperlipidemia)  Assessment and Plan of Treatment: Continue with cholesterol medications. Maintain a healthy diet that consist of low sugar, low fat, low sodium diet. Exercise frequently if possible.  Follow up with primary care physician in one week after discharge.    Diagnosis: HTN (hypertension)  Assessment and Plan of Treatment: Continue with blood pressure medication. Maintain a healthy diet that consist of low sugar, low fat, low sodium diet. Exercise frequently if possible.  Follow up with primary care physician in one week after discharge. PRINCIPAL DISCHARGE DIAGNOSIS  Diagnosis: Superficial ulcer of skin  Assessment and Plan of Treatment: You have superficial ulcer of skin, podiatry was consulted and advised local wound care. TERRELL showed elevated ankle brachial indices compatible with calcified lower extremity arteries, diminished flow within the feet which may be secondary to small vessel disease. Infectious disease Dr. Murrell was consulted. You are being discharged on kwflex for 7 days.      SECONDARY DISCHARGE DIAGNOSES  Diagnosis: Leg edema  Assessment and Plan of Treatment: You have history of varicose veins and continue using stockings.    Diagnosis: DM (diabetes mellitus)  Assessment and Plan of Treatment: Continue with your blood sugar medication. HbAIC was 8.7.  You must maintain a healthy diet that consist of low sugar, low fat, low sodium diet. Exercise frequently if possible. Consider repeating your Hemoglobin A1c within 3 months after discharge to monitor your average blood glucose control. Follow up with primary care physician in one week after discharge.    Diagnosis: HLD (hyperlipidemia)  Assessment and Plan of Treatment: Continue with cholesterol medications. Maintain a healthy diet that consist of low sugar, low fat, low sodium diet. Exercise frequently if possible.  Follow up with primary care physician in one week after discharge.    Diagnosis: HTN (hypertension)  Assessment and Plan of Treatment: Continue with blood pressure medication. Maintain a healthy diet that consist of low sugar, low fat, low sodium diet. Exercise frequently if possible.  Follow up with primary care physician in one week after discharge.

## 2019-08-14 NOTE — PHYSICAL THERAPY INITIAL EVALUATION ADULT - THERAPY FREQUENCY, PT EVAL
[facility]  [address] [facility] [address]  [phone]        December 5, 2018    Js Jones  [address] [facility] [address]              Dear Js Jones    This is to remind you that you have a urine lab due.    You may call our office at [phone] to schedule an appointment.    Please disregard this notice if you have already had your labs drawn or made an appointment.        Sincerely,        Kathya Iglesias CNP/ boydd          
3-5x/week

## 2019-08-14 NOTE — PROGRESS NOTE ADULT - PROBLEM SELECTOR PLAN 1
c/w local wound care,   s/p 1 dose of vancomycin,   no need of antibiotic  Xray tibia/fibula negative  TERRELL showed calcified vessel  podiatry consulted  ID Dr. Murrell consulted

## 2019-08-14 NOTE — PHYSICAL THERAPY INITIAL EVALUATION ADULT - CRITERIA FOR SKILLED THERAPEUTIC INTERVENTIONS
rehab potential/predicted duration of therapy intervention/therapy frequency/anticipated discharge recommendation/impairments found/risk reduction/prevention

## 2019-08-14 NOTE — PROGRESS NOTE ADULT - SUBJECTIVE AND OBJECTIVE BOX
PGY 1 Note discussed with supervising resident and primary attending    Patient is a 83y old  Female who presents with a chief complaint of left leg ulcer (14 Aug 2019 08:27)      INTERVAL HPI/OVERNIGHT EVENTS: no acute overnight events, pt. seen and examined at bedside, offers no complaints. Awaiting rehab placement.    MEDICATIONS  (STANDING):  atorvastatin 20 milliGRAM(s) Oral at bedtime  ergocalciferol 91060 Unit(s) Oral <User Schedule>  furosemide    Tablet 40 milliGRAM(s) Oral daily  heparin  Injectable 5000 Unit(s) SubCutaneous every 8 hours  insulin glargine Injectable (LANTUS) 10 Unit(s) SubCutaneous at bedtime  insulin lispro (HumaLOG) corrective regimen sliding scale   SubCutaneous three times a day before meals  insulin lispro Injectable (HumaLOG) 5 Unit(s) SubCutaneous three times a day before meals  lisinopril 20 milliGRAM(s) Oral daily  melatonin 3 milliGRAM(s) Oral at bedtime    MEDICATIONS  (PRN):      __________________________________________________  REVIEW OF SYSTEMS:    CONSTITUTIONAL: No fever,   EYES: no acute visual disturbances  NECK: No pain or stiffness  RESPIRATORY: No cough; No shortness of breath  CARDIOVASCULAR: No chest pain, no palpitations  GASTROINTESTINAL: No pain. No nausea or vomiting; No diarrhea   NEUROLOGICAL: No headache or numbness, no tremors  MUSCULOSKELETAL: No joint pain, no muscle pain  GENITOURINARY: no dysuria, no frequency, no hesitancy  PSYCHIATRY: no depression , no anxiety  ALL OTHER  ROS negative        Vital Signs Last 24 Hrs  T(C): 36.6 (14 Aug 2019 05:34), Max: 36.6 (13 Aug 2019 14:18)  T(F): 97.8 (14 Aug 2019 05:34), Max: 97.8 (13 Aug 2019 14:18)  HR: 86 (14 Aug 2019 09:40) (62 - 86)  BP: 120/59 (14 Aug 2019 09:40) (109/63 - 149/60)  BP(mean): --  RR: 17 (14 Aug 2019 05:34) (17 - 18)  SpO2: 100% (14 Aug 2019 09:40) (97% - 100%)    ________________________________________________  PHYSICAL EXAM:  GENERAL: NAD  HEENT: Normocephalic;  conjunctivae and sclerae clear; moist mucous membranes;   NECK : supple  CHEST/LUNG: Clear to auscultation bilaterally with good air entry   HEART: S1 S2  regular; no murmurs, gallops or rubs  ABDOMEN: Soft, Nontender, Nondistended; Bowel sounds present  EXTREMITIES: no cyanosis; no edema; no calf tenderness, B/L LE covered with bandage  SKIN: warm and dry; no rash  NERVOUS SYSTEM:  Awake and alert; Oriented  to place, person and time ; no new deficits    _________________________________________________  LABS:                        12.2   5.53  )-----------( 271      ( 13 Aug 2019 13:04 )             39.6     08-13    137  |  102  |  25<H>  ----------------------------<  194<H>  4.0   |  31  |  1.03    Ca    9.2      13 Aug 2019 13:04  Mg     2.0     08-12    TPro  7.1  /  Alb  2.9<L>  /  TBili  0.4  /  DBili  x   /  AST  23  /  ALT  21  /  AlkPhos  109  08-13      Urinalysis Basic - ( 13 Aug 2019 11:40 )    Color: Yellow / Appearance: Clear / S.010 / pH: x  Gluc: x / Ketone: Negative  / Bili: Negative / Urobili: Negative   Blood: x / Protein: 15 / Nitrite: Negative   Leuk Esterase: Trace / RBC: 0-2 /HPF / WBC 11-25 /HPF   Sq Epi: x / Non Sq Epi: Occasional /HPF / Bacteria: Few /HPF      CAPILLARY BLOOD GLUCOSE      POCT Blood Glucose.: 108 mg/dL (14 Aug 2019 08:08)  POCT Blood Glucose.: 340 mg/dL (13 Aug 2019 21:13)  POCT Blood Glucose.: 266 mg/dL (13 Aug 2019 11:38)        RADIOLOGY & ADDITIONAL TESTS:    Imaging Personally Reviewed:  YES    Consultant(s) Notes Reviewed:  YES    Care Discussed with Consultants : YES    Plan of care was discussed with patient and /or primary care giver; all questions and concerns were addressed and care was aligned with patient's wishes.

## 2019-08-14 NOTE — PHYSICAL THERAPY INITIAL EVALUATION ADULT - DIAGNOSIS, PT EVAL
Pt presents with deficits in strength, balance and endurance affecting functional mobility and safety

## 2019-08-14 NOTE — PROGRESS NOTE ADULT - PROBLEM SELECTOR PLAN 4
has h/o diabetes   8.7 hba1c   insulin 17u at bedtime, 5 humalog before each meal  insulin sliding scale  strict control of blood sugars

## 2019-08-14 NOTE — PROGRESS NOTE ADULT - SUBJECTIVE AND OBJECTIVE BOX
S : 83y year old Female seen at bedside for Left leg ulceration.  Patient relates to having the ulceration for few days . Pt states that it started with a blister which she thought was caused by bug bite but then started growing big and started draining lot of fluid and since yesterday she noticed her left leg and foot turned red and painful.  Chief Complaint : Patient is a 83y old  Female who presents with a chief complaint of left leg ulcer (12 Aug 2019 19:39)    HPI : HPI:  82 yo F, ambulates with walker, with PMH of DM, OA, colon cancer in the past s/p remission presents to the ED with with complaints of 3-4d of severe bilateral LE edema with skin break in the left, 7 cm x 7cm circular ulcer anterior aspect on the shin. Patient lives alone, uses insulin, and see the doctor for her cataracts. Patient states denies hx of heart failure, coronary artery disease. Patient reports that the pain began bilaterally with copious discharge fluids, placed an ACE bandage that was soaked through. Patient has a large ulcer in the shin that has been presents for 3-4d. Pt is a poor historian, Patient is accompanied by niece and nephew, sent to the ED by them to get it checked, also concerned regarding pt's ability to take care of herself at home, requesting possible HHA. Patient denies any fever, PINEDO, chest pain, or any other complain (12 Aug 2019 19:39)    Pt seen by podiatry this morning resting comfortably in chair. Pt complain of left calf pain.     Patient admits to  (-) Fevers, (-) Chills, (-) Nausea, (-) Vomiting, (-) Shortness of Breath      PMH: Diabetes mellitus  DM (diabetes mellitus)  HLD (hyperlipidemia)  HTN (hypertension)  Glaucoma  Carotid stenosis  HLD (hyperlipidemia)  Hypertension    PSH:Status post cataract extraction  History of colonoscopy  Status post colectomy  Type 2 diabetes mellitus  Carotid stenosis  Glaucoma  HLD (hyperlipidemia)  Hypertension      Allergies:No Known Drug Allergies  shellfish (Anaphylaxis)      Labs:                          11.2   4.76  )-----------( 228      ( 12 Aug 2019 16:16 )             36.2     WBC Trend  4.76 Date (08-12 @ 16:16)      Chem  08-12    141  |  108  |  27<H>  ----------------------------<  180<H>  4.0   |  29  |  1.08    Ca    9.3      12 Aug 2019 16:16  Mg     2.0     08-12    TPro  7.0  /  Alb  3.3<L>  /  TBili  0.4  /  DBili  x   /  AST  20  /  ALT  22  /  AlkPhos  99  08-12          T(F): 97.4 (08-12-19 @ 20:28), Max: 98.2 (08-12-19 @ 15:20)  HR: 89 (08-12-19 @ 20:28) (89 - 99)  BP: 170/74 (08-12-19 @ 20:28) (146/87 - 173/88)  RR: 17 (08-12-19 @ 20:28) (16 - 18)  SpO2: 99% (08-12-19 @ 20:28) (98% - 99%)  Wt(kg): --    O:   General: Pleasant  female NAD & AOX3.    Integument:  Skin warm, dry and supple bilateral.    Ulceration Left anterior leg superficial in nature, - hyperkeratotic border, wound base Granular , wound size (4 cm X 4cm X 0.1cm) +4 leg edema, +aditya-wound erythema, - purulence, - fluctuance,   - tracking/tunneling, - probe to bone.   Vascular: Dorsalis Pedis and Posterior Tibial pulses 1/4.  Capillary re-fill time less then 5 seconds digits 1-5 bilateral.    Neuro: Protective sensation intact to the level of the digits bilateral.  MSK: Muscle strength 4/5 all major muscle groups bilateral.  Deformity:  A: Left anterior leg ulceration with leg edema      P:   Chart reviewed and Patient evaluated  Discussed diagnosis and treatment with patient  Wound flush with normal saline  wound culture reviwed -4 different types of aerobic and anaerobic org  Applied betadine with dry sterile dressing with multilayer compression,  X-rays Not significant  silvadine cream was ordered  Recomend ultrasound of left leg to rule out DVT  Continue with IV antibiotics As Per ID  TERRELL:1.4 b/l  stable for discharge from podiatry standpoint    wound care orders    apply silvadine cream on 4x4 sterile gauze and secure gauze with shima and tape. please apply multilayer compression with ACE bandage starting from MPJ to anterior tibial tubercle left foot and leg.    Weight bearing as tolerated  Offloading to bilateral Heels and leg elevation   Podiatry will follow while in house.  Discussed with Attending Dr Amaro

## 2019-08-14 NOTE — PHYSICAL THERAPY INITIAL EVALUATION ADULT - ACTIVE RANGE OF MOTION EXAMINATION, REHAB EVAL
however, pt with severe thoracic kyphosis creating hunchback posture with subsequent forward head/no Active ROM deficits were identified

## 2019-08-14 NOTE — DISCHARGE NOTE PROVIDER - HOSPITAL COURSE
HPI:    84 yo F, ambulates with walker, with PMH of DM, OA, colon cancer in the past s/p remission presents to the ED with with complaints of 3-4d of severe bilateral LE edema with skin break in the left, 7 cm x 7cm circular ulcer anterior aspect on the shin. Patient lives alone, uses insulin, and see the doctor for her cataracts. Patient states denies hx of heart failure, coronary artery disease. Patient reports that the pain began bilaterally with copious discharge fluids, placed an ACE bandage that was soaked through. Patient has a large ulcer in the shin that has been presents for 3-4d. Pt is a poor historian, Patient is accompanied by niece and nephew, sent to the ED by them to get it checked, also concerned regarding pt's ability to take care of herself at home, requesting possible HHA. Patient denies any fever, PINEDO, chest pain, or any other complain (12 Aug 2019 19:39)    She has Superficial ulcer of skin, c/w local wound care, s/p 1 dose of vancomycin, Xray tibia/fibula negative, podiatry was consulted, TERRELL showed elevated ankle brachial indices compatible with calcified lower extremity arteries, diminished flow within the feet which may be secondary to small vessel disease. ID Dr. Murrell was consulted and advised no need of meds since it is not infected.    She has Leg edema, has h/o varicose veins, on examination has pedal edema which is present since long and has been using stockings.     She has h/o diabetes, with 8.7 hba1c, insulin 17u at bedtime, 5 humalog before each meal and insulin sliding scale    She has HTN (hypertension), and is on home meds.     Patient is stable for discharge per attending and is advised to follow up with PCP as outpatient    Please refer to patient's complete medical chart with documents for a full hospital course, for this is only a brief summary.

## 2019-08-15 VITALS
OXYGEN SATURATION: 100 % | HEART RATE: 79 BPM | DIASTOLIC BLOOD PRESSURE: 62 MMHG | RESPIRATION RATE: 16 BRPM | SYSTOLIC BLOOD PRESSURE: 121 MMHG | TEMPERATURE: 98 F

## 2019-08-15 LAB
GLUCOSE BLDC GLUCOMTR-MCNC: 121 MG/DL — HIGH (ref 70–99)
GLUCOSE BLDC GLUCOMTR-MCNC: 155 MG/DL — HIGH (ref 70–99)

## 2019-08-15 PROCEDURE — 82962 GLUCOSE BLOOD TEST: CPT

## 2019-08-15 PROCEDURE — 99285 EMERGENCY DEPT VISIT HI MDM: CPT | Mod: 25

## 2019-08-15 PROCEDURE — 80061 LIPID PANEL: CPT

## 2019-08-15 PROCEDURE — 80053 COMPREHEN METABOLIC PANEL: CPT

## 2019-08-15 PROCEDURE — 93923 UPR/LXTR ART STDY 3+ LVLS: CPT

## 2019-08-15 PROCEDURE — 97162 PT EVAL MOD COMPLEX 30 MIN: CPT

## 2019-08-15 PROCEDURE — 36415 COLL VENOUS BLD VENIPUNCTURE: CPT

## 2019-08-15 PROCEDURE — 80048 BASIC METABOLIC PNL TOTAL CA: CPT

## 2019-08-15 PROCEDURE — 85027 COMPLETE CBC AUTOMATED: CPT

## 2019-08-15 PROCEDURE — 83036 HEMOGLOBIN GLYCOSYLATED A1C: CPT

## 2019-08-15 PROCEDURE — 87070 CULTURE OTHR SPECIMN AEROBIC: CPT

## 2019-08-15 PROCEDURE — 71045 X-RAY EXAM CHEST 1 VIEW: CPT

## 2019-08-15 PROCEDURE — 83735 ASSAY OF MAGNESIUM: CPT

## 2019-08-15 PROCEDURE — 83605 ASSAY OF LACTIC ACID: CPT

## 2019-08-15 PROCEDURE — 83880 ASSAY OF NATRIURETIC PEPTIDE: CPT

## 2019-08-15 PROCEDURE — 87075 CULTR BACTERIA EXCEPT BLOOD: CPT

## 2019-08-15 PROCEDURE — 81001 URINALYSIS AUTO W/SCOPE: CPT

## 2019-08-15 PROCEDURE — 73590 X-RAY EXAM OF LOWER LEG: CPT

## 2019-08-15 PROCEDURE — 82607 VITAMIN B-12: CPT

## 2019-08-15 PROCEDURE — 82306 VITAMIN D 25 HYDROXY: CPT

## 2019-08-15 PROCEDURE — 85652 RBC SED RATE AUTOMATED: CPT

## 2019-08-15 PROCEDURE — 84443 ASSAY THYROID STIM HORMONE: CPT

## 2019-08-15 RX ORDER — CEPHALEXIN 500 MG
1 CAPSULE ORAL
Qty: 14 | Refills: 0
Start: 2019-08-15 | End: 2019-08-21

## 2019-08-15 RX ADMIN — Medication 1 APPLICATION(S): at 12:01

## 2019-08-15 RX ADMIN — HEPARIN SODIUM 5000 UNIT(S): 5000 INJECTION INTRAVENOUS; SUBCUTANEOUS at 05:43

## 2019-08-15 RX ADMIN — Medication 5 UNIT(S): at 12:01

## 2019-08-15 RX ADMIN — Medication 5 UNIT(S): at 08:09

## 2019-08-15 RX ADMIN — Medication 40 MILLIGRAM(S): at 05:43

## 2019-08-15 RX ADMIN — Medication 1: at 12:00

## 2019-08-15 RX ADMIN — LISINOPRIL 20 MILLIGRAM(S): 2.5 TABLET ORAL at 05:43

## 2019-08-15 NOTE — PROGRESS NOTE ADULT - PROBLEM SELECTOR PROBLEM 1
Superficial ulcer of skin

## 2019-08-15 NOTE — PROGRESS NOTE ADULT - REASON FOR ADMISSION
left leg ulcer

## 2019-08-15 NOTE — PROGRESS NOTE ADULT - ASSESSMENT
Patient is a 83y old  Female ambulates with walker, and PMH of DM, OA, colon cancer in the past s/p remission, presents to the ER for evaluation of  3-4 days of severe bilateral LE edema with skin break in the left, 7 cm x 7cm circular ulcer anterior aspect on the shin. The pain began bilaterally with copious discharge fluids, placed an ACE bandage that was soaked through. She has no fever or chills. She has evaluated by Podiatry and started on Cefazolin. The ID consult requested to assist with further evaluation and antibiotic management.     # Left Leg superficial ulcer    would recommend:    1. Keep Left Leg elevated   2. Monitor OFF Abx   3. Continue  Dressing change   4. OOB to chair    will follow the patient with you
Pt admitted for LLE ulcer with cellulitis along with requiring HHA for help at home as per niece/nephew  PENDING ID  NEEDS 
Pt admitted for LLE ulcer with cellulitis along with requiring HHA for help at home as per niece/nephew.

## 2019-08-15 NOTE — PROGRESS NOTE ADULT - PROBLEM SELECTOR PLAN 6
IMPROVE VTE Individual Risk Assessment    RISK                                                                Points    [  ] Previous VTE                                                  3    [  ] Thrombophilia                                               2    [  ] Lower limb paralysis                                      2        (unable to hold up >15 seconds)      [  ] Current Cancer                                              2         (within 6 months)    [  ] Immobilization > 24 hrs                                1  [  ] ICU/CCU stay > 24 hours                              1  [1  ] Age > 60                                                      1  IMPROVE VTE Score __1.   IMPROVE Score1: started on heparin as prophylaxis
IMPROVE VTE Individual Risk Assessment    RISK                                                                Points    [  ] Previous VTE                                                  3    [  ] Thrombophilia                                               2    [  ] Lower limb paralysis                                      2        (unable to hold up >15 seconds)      [  ] Current Cancer                                              2         (within 6 months)    [  ] Immobilization > 24 hrs                                1  [  ] ICU/CCU stay > 24 hours                              1  [1  ] Age > 60                                                      1  IMPROVE VTE Score __1.   IMPROVE Score1: started on heparin as prophylaxis

## 2019-08-15 NOTE — PROGRESS NOTE ADULT - SUBJECTIVE AND OBJECTIVE BOX
Impaired Cognition    • Patient will exhibit improved attention, thought processing and/or memory Adequate for Discharge        SKIN/TISSUE INTEGRITY - ADULT    • Oral mucous membranes remain intact Adequate for Discharge          Impaired Activities of Da S : 83y year old Female seen at bedside for Left leg ulceration.  Patient relates to having the ulceration for few days . Pt states that it started with a blister which she thought was caused by bug bite but then started growing big and started draining lot of fluid and since yesterday she noticed her left leg and foot turned red and painful.  Chief Complaint : Patient is a 83y old  Female who presents with a chief complaint of left leg ulcer (12 Aug 2019 19:39)    HPI : HPI:  84 yo F, ambulates with walker, with PMH of DM, OA, colon cancer in the past s/p remission presents to the ED with with complaints of 3-4d of severe bilateral LE edema with skin break in the left, 7 cm x 7cm circular ulcer anterior aspect on the shin. Patient lives alone, uses insulin, and see the doctor for her cataracts. Patient states denies hx of heart failure, coronary artery disease. Patient reports that the pain began bilaterally with copious discharge fluids, placed an ACE bandage that was soaked through. Patient has a large ulcer in the shin that has been presents for 3-4d. Pt is a poor historian, Patient is accompanied by niece and nephew, sent to the ED by them to get it checked, also concerned regarding pt's ability to take care of herself at home, requesting possible HHA. Patient denies any fever, PINEDO, chest pain, or any other complain (12 Aug 2019 19:39)  pt seen by podiatry today resting comfortbly in bed and legs elevated.      Patient admits to  (-) Fevers, (-) Chills, (-) Nausea, (-) Vomiting, (-) Shortness of Breath      PMH: Diabetes mellitus  DM (diabetes mellitus)  HLD (hyperlipidemia)  HTN (hypertension)  Glaucoma  Carotid stenosis  HLD (hyperlipidemia)  Hypertension    PSH:Status post cataract extraction  History of colonoscopy  Status post colectomy  Type 2 diabetes mellitus  Carotid stenosis  Glaucoma  HLD (hyperlipidemia)  Hypertension      Allergies:No Known Drug Allergies  shellfish (Anaphylaxis)      Labs:                          11.2   4.76  )-----------( 228      ( 12 Aug 2019 16:16 )             36.2     WBC Trend  4.76 Date (08-12 @ 16:16)      Chem  08-12    141  |  108  |  27<H>  ----------------------------<  180<H>  4.0   |  29  |  1.08    Ca    9.3      12 Aug 2019 16:16  Mg     2.0     08-12    TPro  7.0  /  Alb  3.3<L>  /  TBili  0.4  /  DBili  x   /  AST  20  /  ALT  22  /  AlkPhos  99  08-12          T(F): 97.4 (08-12-19 @ 20:28), Max: 98.2 (08-12-19 @ 15:20)  HR: 89 (08-12-19 @ 20:28) (89 - 99)  BP: 170/74 (08-12-19 @ 20:28) (146/87 - 173/88)  RR: 17 (08-12-19 @ 20:28) (16 - 18)  SpO2: 99% (08-12-19 @ 20:28) (98% - 99%)  Wt(kg): --    O:   General: Pleasant  female NAD & AOX3.    Integument:  Skin warm, dry and supple bilateral.    Ulceration Left anterior leg superficial in nature, - hyperkeratotic border, wound base Granular , wound size (4 cm X 4cm X 0.1cm) +4 leg edema, +aditya-wound erythema, - purulence, - fluctuance,   - tracking/tunneling, - probe to bone.   Vascular: Dorsalis Pedis and Posterior Tibial pulses 1/4.  Capillary re-fill time less then 5 seconds digits 1-5 bilateral.    Neuro: Protective sensation intact to the level of the digits bilateral.  MSK: Muscle strength 4/5 all major muscle groups bilateral.  Deformity:  A: Left anterior leg ulceration with leg edema      P:   Chart reviewed and Patient evaluated  Discussed diagnosis and treatment with patient  Wound flush with normal saline  wound culture reviewed  Applied betadine with dry sterile dressing with multilayer compression.  ordered silvadine to be applied tomorrow  X-rays reviewed insignificant  Continue with IV antibiotics As Per ID   TERRELL 1.4 b/l  stable for discharge  Weight bearing as tolerated  Offloading to bilateral Heels and leg elevation   Podiatry will follow while in house.  Discussed with Attending Dr Amaro    wound care instructions  apply silvadine cream on 4x4 sterile gauze and secure it on left anterior leg by using shima and tape and apply multilayer compresion by using ACE.  Keep dressing dry and intact.  change daily

## 2019-08-15 NOTE — PROGRESS NOTE ADULT - SUBJECTIVE AND OBJECTIVE BOX
PGY 1 Note discussed with supervising resident and primary attending    Patient is a 83y old  Female who presents with a chief complaint of left leg ulcer (14 Aug 2019 18:42)      INTERVAL HPI/OVERNIGHT EVENTS: offers no new complaints; current symptoms resolving, awaiting rehab placement    MEDICATIONS  (STANDING):  atorvastatin 20 milliGRAM(s) Oral at bedtime  ergocalciferol 14294 Unit(s) Oral <User Schedule>  furosemide    Tablet 40 milliGRAM(s) Oral daily  heparin  Injectable 5000 Unit(s) SubCutaneous every 8 hours  insulin glargine Injectable (LANTUS) 10 Unit(s) SubCutaneous at bedtime  insulin lispro (HumaLOG) corrective regimen sliding scale   SubCutaneous three times a day before meals  insulin lispro Injectable (HumaLOG) 5 Unit(s) SubCutaneous three times a day before meals  lisinopril 20 milliGRAM(s) Oral daily  melatonin 3 milliGRAM(s) Oral at bedtime  silver sulfADIAZINE 1% Cream 1 Application(s) Topical daily    MEDICATIONS  (PRN):      __________________________________________________  REVIEW OF SYSTEMS:    CONSTITUTIONAL: No fever,   EYES: no acute visual disturbances  NECK: No pain or stiffness  RESPIRATORY: No cough; No shortness of breath  CARDIOVASCULAR: No chest pain, no palpitations  GASTROINTESTINAL: No pain. No nausea or vomiting; No diarrhea   NEUROLOGICAL: No headache or numbness, no tremors  MUSCULOSKELETAL: No joint pain, no muscle pain  GENITOURINARY: no dysuria, no frequency, no hesitancy  PSYCHIATRY: no depression , no anxiety  ALL OTHER  ROS negative        Vital Signs Last 24 Hrs  T(C): 36.6 (15 Aug 2019 05:29), Max: 36.8 (14 Aug 2019 14:00)  T(F): 97.8 (15 Aug 2019 05:29), Max: 98.3 (14 Aug 2019 20:24)  HR: 77 (15 Aug 2019 05:29) (77 - 99)  BP: 121/59 (15 Aug 2019 05:29) (104/64 - 131/54)  BP(mean): --  RR: 16 (15 Aug 2019 05:29) (16 - 16)  SpO2: 98% (15 Aug 2019 05:29) (98% - 99%)    ________________________________________________  PHYSICAL EXAM:  GENERAL: NAD  HEENT: Normocephalic;  conjunctivae and sclerae clear; moist mucous membranes;   NECK : supple  CHEST/LUNG: Clear to auscultation bilaterally with good air entry   HEART: S1 S2  regular; no murmurs, gallops or rubs  ABDOMEN: Soft, Nontender, Nondistended; Bowel sounds present  EXTREMITIES: no cyanosis; no edema; no calf tenderness, B/L LE covered with bandage  SKIN: warm and dry; no rash  NERVOUS SYSTEM:  Awake and alert; Oriented  to place, person and time ; no new deficits    _________________________________________________  LABS:                        12.2   5.53  )-----------( 271      ( 13 Aug 2019 13:04 )             39.6     08-13    137  |  102  |  25<H>  ----------------------------<  194<H>  4.0   |  31  |  1.03    Ca    9.2      13 Aug 2019 13:04    TPro  7.1  /  Alb  2.9<L>  /  TBili  0.4  /  DBili  x   /  AST  23  /  ALT  21  /  AlkPhos  109  08-13      Urinalysis Basic - ( 13 Aug 2019 11:40 )    Color: Yellow / Appearance: Clear / S.010 / pH: x  Gluc: x / Ketone: Negative  / Bili: Negative / Urobili: Negative   Blood: x / Protein: 15 / Nitrite: Negative   Leuk Esterase: Trace / RBC: 0-2 /HPF / WBC 11-25 /HPF   Sq Epi: x / Non Sq Epi: Occasional /HPF / Bacteria: Few /HPF      CAPILLARY BLOOD GLUCOSE      POCT Blood Glucose.: 121 mg/dL (15 Aug 2019 08:01)  POCT Blood Glucose.: 225 mg/dL (14 Aug 2019 21:41)  POCT Blood Glucose.: 136 mg/dL (14 Aug 2019 16:55)  POCT Blood Glucose.: 357 mg/dL (14 Aug 2019 11:50)        RADIOLOGY & ADDITIONAL TESTS:    Imaging Personally Reviewed:   YES    Consultant(s) Notes Reviewed:   YES    Care Discussed with Consultants :  YES    Plan of care was discussed with patient and /or primary care giver; all questions and concerns were addressed and care was aligned with patient's wishes.

## 2019-08-15 NOTE — PROGRESS NOTE ADULT - PROBLEM SELECTOR PLAN 5
started home meds
insulin 17u at bedtime, 5 humalog before each meal  insulin sliding scale
IMPROVE VTE Individual Risk Assessment    RISK                                                                Points    [  ] Previous VTE                                                  3    [  ] Thrombophilia                                               2    [  ] Lower limb paralysis                                      2        (unable to hold up >15 seconds)      [  ] Current Cancer                                              2         (within 6 months)    [  ] Immobilization > 24 hrs                                1  [  ] ICU/CCU stay > 24 hours                              1  [1  ] Age > 60                                                      1  IMPROVE VTE Score __1.   IMPROVE Score1: started on heparin as prophylaxis
started home meds

## 2019-08-15 NOTE — PROGRESS NOTE ADULT - PROVIDER SPECIALTY LIST ADULT
Infectious Disease
Internal Medicine
Podiatry
Internal Medicine

## 2019-08-15 NOTE — PROGRESS NOTE ADULT - PROBLEM SELECTOR PLAN 3
pt has h/o varicose veins  on examination has pedal edema which is present since long and has been using stockings
has h/o diabetes   f/u hba1c   insulin 17u at bedtime, 5 humalog before each meal  insulin sliding scale  strict control of blood sugars
pt has h/o varicose veins  on examination has pedal edema which is present since long and has been using stockings

## 2019-08-18 LAB — GLUCOSE BLDC GLUCOMTR-MCNC: 207 MG/DL — HIGH (ref 70–99)

## 2020-02-25 ENCOUNTER — INPATIENT (INPATIENT)
Facility: HOSPITAL | Age: 85
LOS: 6 days | Discharge: NURSING, REHAB OR LONG-TERM CARE | DRG: 100 | End: 2020-03-03
Attending: INTERNAL MEDICINE | Admitting: INTERNAL MEDICINE
Payer: MEDICARE

## 2020-02-25 VITALS
RESPIRATION RATE: 20 BRPM | OXYGEN SATURATION: 99 % | TEMPERATURE: 98 F | SYSTOLIC BLOOD PRESSURE: 115 MMHG | WEIGHT: 128.09 LBS | HEIGHT: 65 IN | HEART RATE: 105 BPM | DIASTOLIC BLOOD PRESSURE: 69 MMHG

## 2020-02-25 DIAGNOSIS — N17.9 ACUTE KIDNEY FAILURE, UNSPECIFIED: ICD-10-CM

## 2020-02-25 DIAGNOSIS — Z98.49 CATARACT EXTRACTION STATUS, UNSPECIFIED EYE: Chronic | ICD-10-CM

## 2020-02-25 DIAGNOSIS — E11.9 TYPE 2 DIABETES MELLITUS WITHOUT COMPLICATIONS: ICD-10-CM

## 2020-02-25 DIAGNOSIS — Z90.49 ACQUIRED ABSENCE OF OTHER SPECIFIED PARTS OF DIGESTIVE TRACT: Chronic | ICD-10-CM

## 2020-02-25 DIAGNOSIS — R55 SYNCOPE AND COLLAPSE: ICD-10-CM

## 2020-02-25 DIAGNOSIS — Z29.9 ENCOUNTER FOR PROPHYLACTIC MEASURES, UNSPECIFIED: ICD-10-CM

## 2020-02-25 DIAGNOSIS — Z71.89 OTHER SPECIFIED COUNSELING: ICD-10-CM

## 2020-02-25 DIAGNOSIS — G93.40 ENCEPHALOPATHY, UNSPECIFIED: ICD-10-CM

## 2020-02-25 DIAGNOSIS — Z98.890 OTHER SPECIFIED POSTPROCEDURAL STATES: Chronic | ICD-10-CM

## 2020-02-25 LAB
ALBUMIN SERPL ELPH-MCNC: 3.5 G/DL — SIGNIFICANT CHANGE UP (ref 3.5–5)
ALP SERPL-CCNC: 140 U/L — HIGH (ref 40–120)
ALT FLD-CCNC: 49 U/L DA — SIGNIFICANT CHANGE UP (ref 10–60)
ANION GAP SERPL CALC-SCNC: 12 MMOL/L — SIGNIFICANT CHANGE UP (ref 5–17)
ANISOCYTOSIS BLD QL: SLIGHT — SIGNIFICANT CHANGE UP
APPEARANCE UR: CLEAR — SIGNIFICANT CHANGE UP
APPEARANCE UR: CLEAR — SIGNIFICANT CHANGE UP
APTT BLD: 28.5 SEC — SIGNIFICANT CHANGE UP (ref 27.5–36.3)
AST SERPL-CCNC: 82 U/L — HIGH (ref 10–40)
BACTERIA # UR AUTO: ABNORMAL /HPF
BASOPHILS # BLD AUTO: 0 K/UL — SIGNIFICANT CHANGE UP (ref 0–0.2)
BASOPHILS NFR BLD AUTO: 0 % — SIGNIFICANT CHANGE UP (ref 0–2)
BILIRUB SERPL-MCNC: 0.8 MG/DL — SIGNIFICANT CHANGE UP (ref 0.2–1.2)
BILIRUB UR-MCNC: NEGATIVE — SIGNIFICANT CHANGE UP
BILIRUB UR-MCNC: NEGATIVE — SIGNIFICANT CHANGE UP
BUN SERPL-MCNC: 41 MG/DL — HIGH (ref 7–18)
CALCIUM SERPL-MCNC: 10 MG/DL — SIGNIFICANT CHANGE UP (ref 8.4–10.5)
CHLORIDE SERPL-SCNC: 102 MMOL/L — SIGNIFICANT CHANGE UP (ref 96–108)
CK MB BLD-MCNC: 5.5 % — HIGH (ref 0–3.5)
CK MB CFR SERPL CALC: 25.7 NG/ML — HIGH (ref 0–3.6)
CK SERPL-CCNC: 465 U/L — HIGH (ref 21–215)
CK SERPL-CCNC: 746 U/L — HIGH (ref 21–215)
CO2 SERPL-SCNC: 27 MMOL/L — SIGNIFICANT CHANGE UP (ref 22–31)
COLOR SPEC: YELLOW — SIGNIFICANT CHANGE UP
COLOR SPEC: YELLOW — SIGNIFICANT CHANGE UP
CREAT ?TM UR-MCNC: 97 MG/DL — SIGNIFICANT CHANGE UP
CREAT SERPL-MCNC: 1.31 MG/DL — HIGH (ref 0.5–1.3)
DIFF PNL FLD: ABNORMAL
DIFF PNL FLD: NEGATIVE — SIGNIFICANT CHANGE UP
EOSINOPHIL # BLD AUTO: 0.1 K/UL — SIGNIFICANT CHANGE UP (ref 0–0.5)
EOSINOPHIL NFR BLD AUTO: 1 % — SIGNIFICANT CHANGE UP (ref 0–6)
EPI CELLS # UR: ABNORMAL /HPF
GLUCOSE BLDC GLUCOMTR-MCNC: 138 MG/DL — HIGH (ref 70–99)
GLUCOSE SERPL-MCNC: 194 MG/DL — HIGH (ref 70–99)
GLUCOSE UR QL: 250
GLUCOSE UR QL: 250
HCT VFR BLD CALC: 41.6 % — SIGNIFICANT CHANGE UP (ref 34.5–45)
HGB BLD-MCNC: 13.4 G/DL — SIGNIFICANT CHANGE UP (ref 11.5–15.5)
HYALINE CASTS # UR AUTO: ABNORMAL /LPF
INR BLD: 0.97 RATIO — SIGNIFICANT CHANGE UP (ref 0.88–1.16)
KETONES UR-MCNC: ABNORMAL
KETONES UR-MCNC: ABNORMAL
LACTATE SERPL-SCNC: 1.6 MMOL/L — SIGNIFICANT CHANGE UP (ref 0.7–2)
LEUKOCYTE ESTERASE UR-ACNC: ABNORMAL
LEUKOCYTE ESTERASE UR-ACNC: NEGATIVE — SIGNIFICANT CHANGE UP
LYMPHOCYTES # BLD AUTO: 0.48 K/UL — LOW (ref 1–3.3)
LYMPHOCYTES # BLD AUTO: 5 % — LOW (ref 13–44)
MANUAL SMEAR VERIFICATION: SIGNIFICANT CHANGE UP
MCHC RBC-ENTMCNC: 29.3 PG — SIGNIFICANT CHANGE UP (ref 27–34)
MCHC RBC-ENTMCNC: 32.2 GM/DL — SIGNIFICANT CHANGE UP (ref 32–36)
MCV RBC AUTO: 90.8 FL — SIGNIFICANT CHANGE UP (ref 80–100)
METAMYELOCYTES # FLD: 1 % — HIGH (ref 0–0)
MICROCYTES BLD QL: SLIGHT — SIGNIFICANT CHANGE UP
MONOCYTES # BLD AUTO: 0.48 K/UL — SIGNIFICANT CHANGE UP (ref 0–0.9)
MONOCYTES NFR BLD AUTO: 5 % — SIGNIFICANT CHANGE UP (ref 2–14)
NEUTROPHILS # BLD AUTO: 8.51 K/UL — HIGH (ref 1.8–7.4)
NEUTROPHILS NFR BLD AUTO: 88 % — HIGH (ref 43–77)
NITRITE UR-MCNC: NEGATIVE — SIGNIFICANT CHANGE UP
NITRITE UR-MCNC: NEGATIVE — SIGNIFICANT CHANGE UP
NRBC # BLD: 0 /100 — SIGNIFICANT CHANGE UP (ref 0–0)
OSMOLALITY UR: 671 MOS/KG — SIGNIFICANT CHANGE UP (ref 50–1200)
OVALOCYTES BLD QL SMEAR: SLIGHT — SIGNIFICANT CHANGE UP
PCP SPEC-MCNC: SIGNIFICANT CHANGE UP
PH UR: 5 — SIGNIFICANT CHANGE UP (ref 5–8)
PH UR: 5 — SIGNIFICANT CHANGE UP (ref 5–8)
PLAT MORPH BLD: NORMAL — SIGNIFICANT CHANGE UP
PLATELET # BLD AUTO: 264 K/UL — SIGNIFICANT CHANGE UP (ref 150–400)
POIKILOCYTOSIS BLD QL AUTO: SLIGHT — SIGNIFICANT CHANGE UP
POLYCHROMASIA BLD QL SMEAR: SLIGHT — SIGNIFICANT CHANGE UP
POTASSIUM SERPL-MCNC: 4.6 MMOL/L — SIGNIFICANT CHANGE UP (ref 3.5–5.3)
POTASSIUM SERPL-SCNC: 4.6 MMOL/L — SIGNIFICANT CHANGE UP (ref 3.5–5.3)
PROT ?TM UR-MCNC: 67 MG/DL — HIGH (ref 0–12)
PROT SERPL-MCNC: 7.3 G/DL — SIGNIFICANT CHANGE UP (ref 6–8.3)
PROT UR-MCNC: 100
PROT UR-MCNC: 100
PROTHROM AB SERPL-ACNC: 10.7 SEC — SIGNIFICANT CHANGE UP (ref 10–12.9)
RBC # BLD: 4.58 M/UL — SIGNIFICANT CHANGE UP (ref 3.8–5.2)
RBC # FLD: 14.2 % — SIGNIFICANT CHANGE UP (ref 10.3–14.5)
RBC BLD AUTO: ABNORMAL
RBC CASTS # UR COMP ASSIST: SIGNIFICANT CHANGE UP /HPF (ref 0–2)
SODIUM SERPL-SCNC: 141 MMOL/L — SIGNIFICANT CHANGE UP (ref 135–145)
SODIUM UR-SCNC: 27 MMOL/L — SIGNIFICANT CHANGE UP
SP GR SPEC: 1.02 — SIGNIFICANT CHANGE UP (ref 1.01–1.02)
SP GR SPEC: 1.02 — SIGNIFICANT CHANGE UP (ref 1.01–1.02)
SPHEROCYTES BLD QL SMEAR: SLIGHT — SIGNIFICANT CHANGE UP
TROPONIN I SERPL-MCNC: 0.02 NG/ML — SIGNIFICANT CHANGE UP (ref 0–0.04)
TROPONIN I SERPL-MCNC: 0.04 NG/ML — SIGNIFICANT CHANGE UP (ref 0–0.04)
UROBILINOGEN FLD QL: NEGATIVE — SIGNIFICANT CHANGE UP
UROBILINOGEN FLD QL: NEGATIVE — SIGNIFICANT CHANGE UP
WBC # BLD: 9.67 K/UL — SIGNIFICANT CHANGE UP (ref 3.8–10.5)
WBC # FLD AUTO: 9.67 K/UL — SIGNIFICANT CHANGE UP (ref 3.8–10.5)
WBC UR QL: SIGNIFICANT CHANGE UP /HPF (ref 0–5)

## 2020-02-25 PROCEDURE — 99285 EMERGENCY DEPT VISIT HI MDM: CPT

## 2020-02-25 PROCEDURE — 71045 X-RAY EXAM CHEST 1 VIEW: CPT | Mod: 26,77

## 2020-02-25 PROCEDURE — 71045 X-RAY EXAM CHEST 1 VIEW: CPT | Mod: 26

## 2020-02-25 PROCEDURE — 72170 X-RAY EXAM OF PELVIS: CPT | Mod: 26

## 2020-02-25 PROCEDURE — 70450 CT HEAD/BRAIN W/O DYE: CPT | Mod: 26

## 2020-02-25 PROCEDURE — 99223 1ST HOSP IP/OBS HIGH 75: CPT | Mod: AI,GC

## 2020-02-25 RX ORDER — INSULIN GLARGINE 100 [IU]/ML
4 INJECTION, SOLUTION SUBCUTANEOUS ONCE
Refills: 0 | Status: COMPLETED | OUTPATIENT
Start: 2020-02-25 | End: 2020-02-25

## 2020-02-25 RX ORDER — SODIUM CHLORIDE 9 MG/ML
1000 INJECTION INTRAMUSCULAR; INTRAVENOUS; SUBCUTANEOUS ONCE
Refills: 0 | Status: COMPLETED | OUTPATIENT
Start: 2020-02-25 | End: 2020-02-25

## 2020-02-25 RX ORDER — SODIUM CHLORIDE 9 MG/ML
1000 INJECTION, SOLUTION INTRAVENOUS
Refills: 0 | Status: DISCONTINUED | OUTPATIENT
Start: 2020-02-25 | End: 2020-03-01

## 2020-02-25 RX ORDER — ATORVASTATIN CALCIUM 80 MG/1
1 TABLET, FILM COATED ORAL
Qty: 0 | Refills: 0 | DISCHARGE

## 2020-02-25 RX ORDER — ATORVASTATIN CALCIUM 80 MG/1
20 TABLET, FILM COATED ORAL AT BEDTIME
Refills: 0 | Status: DISCONTINUED | OUTPATIENT
Start: 2020-02-25 | End: 2020-03-03

## 2020-02-25 RX ORDER — HEPARIN SODIUM 5000 [USP'U]/ML
5000 INJECTION INTRAVENOUS; SUBCUTANEOUS EVERY 8 HOURS
Refills: 0 | Status: DISCONTINUED | OUTPATIENT
Start: 2020-02-25 | End: 2020-03-03

## 2020-02-25 RX ORDER — ASPIRIN/CALCIUM CARB/MAGNESIUM 324 MG
81 TABLET ORAL DAILY
Refills: 0 | Status: DISCONTINUED | OUTPATIENT
Start: 2020-02-25 | End: 2020-03-03

## 2020-02-25 RX ORDER — INSULIN LISPRO 100/ML
VIAL (ML) SUBCUTANEOUS
Refills: 0 | Status: DISCONTINUED | OUTPATIENT
Start: 2020-02-25 | End: 2020-03-01

## 2020-02-25 RX ORDER — INSULIN GLARGINE 100 [IU]/ML
17 INJECTION, SOLUTION SUBCUTANEOUS
Qty: 0 | Refills: 0 | DISCHARGE

## 2020-02-25 RX ORDER — INSULIN GLARGINE 100 [IU]/ML
8 INJECTION, SOLUTION SUBCUTANEOUS AT BEDTIME
Refills: 0 | Status: DISCONTINUED | OUTPATIENT
Start: 2020-02-25 | End: 2020-03-03

## 2020-02-25 RX ADMIN — SODIUM CHLORIDE 100 MILLILITER(S): 9 INJECTION, SOLUTION INTRAVENOUS at 21:53

## 2020-02-25 RX ADMIN — SODIUM CHLORIDE 1000 MILLILITER(S): 9 INJECTION INTRAMUSCULAR; INTRAVENOUS; SUBCUTANEOUS at 15:10

## 2020-02-25 RX ADMIN — HEPARIN SODIUM 5000 UNIT(S): 5000 INJECTION INTRAVENOUS; SUBCUTANEOUS at 21:54

## 2020-02-25 RX ADMIN — ATORVASTATIN CALCIUM 20 MILLIGRAM(S): 80 TABLET, FILM COATED ORAL at 21:54

## 2020-02-25 RX ADMIN — INSULIN GLARGINE 4 UNIT(S): 100 INJECTION, SOLUTION SUBCUTANEOUS at 23:11
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
## 2020-02-25 NOTE — H&P ADULT - PROBLEM SELECTOR PLAN 6
patient is poor historian  unable to discuss GOC at this time  primary team to follow up  FULL CODE for now

## 2020-02-25 NOTE — ED ADULT NURSE NOTE - NSIMPLEMENTINTERV_GEN_ALL_ED
Implemented All Fall with Harm Risk Interventions:  [place] to call system. Call bell, personal items and telephone within reach. Instruct patient to call for assistance. Room bathroom lighting operational. Non-slip footwear when patient is off stretcher. Physically safe environment: no spills, clutter or unnecessary equipment. Stretcher in lowest position, wheels locked, appropriate side rails in place. Provide visual cue, wrist band, yellow gown, etc. Monitor gait and stability. Monitor for mental status changes and reorient to person, place, and time. Review medications for side effects contributing to fall risk. Reinforce activity limits and safety measures with patient and family. Provide visual clues: red socks.
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
## 2020-02-25 NOTE — H&P ADULT - PROBLEM SELECTOR PLAN 1
patient presented after fall at home  patient is poor historian; unsure of baseline  concern for encephalopathy, possibly toxic   r/o stroke  CT head negative  f/u urine drug screen, RPR, TSH, Vitamin B12, folate   -neuro consulted   -cardio Dr Murrell consulted   -tele monitor, asa and statin   -f/u dysphagia screen, S/S eval   -f/u T2 at 9pm   -f/u blood and urine cx   -f/u ECHO

## 2020-02-25 NOTE — ED PROVIDER NOTE - MUSCULOSKELETAL, MLM
Spine appears normal, range of motion is not limited, no muscle or joint tenderness, no overt signs of trauma. Spine appears normal, range of motion is not limited, no muscle or joint tenderness, no overt signs of trauma. unable to weight bear

## 2020-02-25 NOTE — ED PROVIDER NOTE - SKIN, MLM
Skin normal color for race, warm, dry and intact. No evidence of rash. Healed abrasion with scab on right lateral elbow.

## 2020-02-25 NOTE — ED PROVIDER NOTE - PROGRESS NOTE DETAILS
sign out patient to Christopher Eagle (DO) Santiago: patient pending CT scan and XRay. Plan on admission to hospitalist service. sign out patient to Christopher Eagle) endorsed to Dr. Camp. Request UDS.

## 2020-02-25 NOTE — ED PROVIDER NOTE - CLINICAL SUMMARY MEDICAL DECISION MAKING FREE TEXT BOX
85 yo F found unresponsive by occupational therapist. Patient moving all extremities. With diffuse pain. Labs, CT, and Xray. Plan on admission for weakness. 83 yo F found unresponsive by occupational therapist. Patient moving all extremities. With diffuse pain. Labs, CT, and Xray. Plan on admission for syncope/weakness.

## 2020-02-25 NOTE — H&P ADULT - ASSESSMENT
Patient is an 84 year old female, with PMH of colon cancer s/p colectomy, DM and OA, who comes in due to unwitnessed fall found on floor by occupational therapist.    Troponin 1 negative.   CK of 746  UA negative   CT head negative     Admitted for syncopal workup.

## 2020-02-25 NOTE — H&P ADULT - PROBLEM SELECTOR PLAN 5
IMPROVE VTE Individual Risk Assessment  RISK                                                                Points  [  ] Previous VTE                                                  3  [  ] Thrombophilia                                               2  [  ] Lower limb paralysis                                      2        (unable to hold up >15 seconds)    [  ] Current Cancer                                              2         (within 6 months)  [  ] Immobilization > 24 hrs                                1  [  ] ICU/CCU stay > 24 hours                              1  [X ] Age > 60                                                      1    IMPROVE VTE Score  1    heparin for DVT prophylaxis

## 2020-02-25 NOTE — ED ADULT TRIAGE NOTE - CHIEF COMPLAINT QUOTE
Found on floor possibly for 1-2 days, incontinent of urine. Found by Occupational Therapist. Pt discharged from Nursing home x 1-2 weeks ago.

## 2020-02-25 NOTE — H&P ADULT - ATTENDING COMMENTS
Patient was examined in the ED and discussed with Dr. Helms.   She was brought from home, having been found on the floor by her occupational therapist.   She was recently discharged from [facility].   Alert, cooperative, but confused woman, malapropisms, confused reporting, but oriented to person and year.   Vital Signs Last 24 Hrs  T(C): 36.4 (2020 13:06), Max: 36.4 (2020 13:06)  T(F): 97.6 (2020 13:06), Max: 97.6 (2020 13:06)  HR: 105 (2020 13:06) (105 - 105)  BP: 115/69 (2020 13:06) (115/69 - 115/69)  BP(mean): --  RR: 20 (2020 13:06) (20 - 20)  SpO2: 99% (2020 13:06) (99% - 99%)  Prosthetic teeth  Neck, supple  Lungs, clear  Cor, RRR  Abdomen, soft  Ext, venous stasis changes  Neurological, alert, confused, non-focal                       13.4   9.67  )-----------( 264      ( 2020 14:58 )             41.6     02-25    141  |  102  |  41<H>  ----------------------------<  194<H>  4.6   |  27  |  1.31<H>    Ca    10.0      2020 14:58    TPro  7.3  /  Alb  3.5  /  TBili  0.8  /  DBili  x   /  AST  82<H>  /  ALT  49  /  AlkPhos  140<H>  02-25  Urinalysis Basic - ( 2020 15:59 )    Color: Yellow / Appearance: Clear / S.020 / pH: x  Gluc: x / Ketone: Small  / Bili: Negative / Urobili: Negative   Blood: x / Protein: 100 / Nitrite: Negative   Leuk Esterase: Negative / RBC: 0-2 /HPF / WBC 0-2 /HPF   Sq Epi: x / Non Sq Epi: Occasional /HPF / Bacteria: Trace /HPF  PT/INR - ( 2020 14:58 )   PT: 10.7 sec;   INR: 0.97 ratio    PTT - ( 2020 14:58 )  PTT:28.5 sec  Lactate Trend   @ 14:58 Lactate:1.6   CARDIAC MARKERS ( 2020 14:58 )  0.017 ng/mL / x     / 746 U/L / x     / x      POCT Blood Glucose.: 169 mg/dL (2020 13:31)  < from: CT Head No Cont (20 @ 16:46) >  There is no evidence of hydrocephalus. There are no extra-axial fluid collections.    The patient is status post bilateral lens replacement. Visualized intraorbital contents are otherwise unremarkable. The imaged portions of the paranasal sinuses are aerated. The mastoid air cells are clear. The visualized soft tissues and osseous structures appear unremarkable. Please note, streak artifact related to dental hardware partially limits assessment of the posterior fossa.  IMPRESSION:    No acute intracranial findings.    < end of copied text >    < from: CT Head No Cont (20 @ 16:46) >    There is no evidence of acute infarction, intracranial hemorrhage or mass lesion.  There is hypoattenuation of the subcortical and periventricular white matter, which is nonspecific finding, but most likely represents sequela of chronic microvascular ischemic disease. There are atherosclerotic calcifications of the cavernous and supraclinoid internal carotid arteries bilaterally. There is prominence of the cortical sulci related to underlying brain parenchymal volume loss.  IMP:  Encephalopathy, likely toxic/metabolic, but r/o CVA          f/p fall          JOANN          Rhabdomyolosis, likely post fall          DM, moderate control          Venous stasis disease of the LE  Plan: IV hydration, tele/troponin/echo/ Cardiology, Dr. Murrell          Neurology consultation          Continue basal/bolus insulin

## 2020-02-25 NOTE — H&P ADULT - PROBLEM SELECTOR PLAN 3
noted with creatinine of 1.31 on admission  normal creatinine based on previous admission   -IVF for 24 hours; monitor BMP  -f/u repeat creatinine kinase   -f/u urine lytes   -hold lisinopril in setting of JOANN and allow for permissive HTN

## 2020-02-26 DIAGNOSIS — M62.82 RHABDOMYOLYSIS: ICD-10-CM

## 2020-02-26 DIAGNOSIS — R29.6 REPEATED FALLS: ICD-10-CM

## 2020-02-26 DIAGNOSIS — M19.90 UNSPECIFIED OSTEOARTHRITIS, UNSPECIFIED SITE: ICD-10-CM

## 2020-02-26 LAB
ALBUMIN SERPL ELPH-MCNC: 2.7 G/DL — LOW (ref 3.5–5)
ALP SERPL-CCNC: 109 U/L — SIGNIFICANT CHANGE UP (ref 40–120)
ALT FLD-CCNC: 36 U/L DA — SIGNIFICANT CHANGE UP (ref 10–60)
ANION GAP SERPL CALC-SCNC: 9 MMOL/L — SIGNIFICANT CHANGE UP (ref 5–17)
AST SERPL-CCNC: 45 U/L — HIGH (ref 10–40)
BASOPHILS # BLD AUTO: 0.01 K/UL — SIGNIFICANT CHANGE UP (ref 0–0.2)
BASOPHILS NFR BLD AUTO: 0.1 % — SIGNIFICANT CHANGE UP (ref 0–2)
BILIRUB SERPL-MCNC: 0.5 MG/DL — SIGNIFICANT CHANGE UP (ref 0.2–1.2)
BUN SERPL-MCNC: 36 MG/DL — HIGH (ref 7–18)
CALCIUM SERPL-MCNC: 9.3 MG/DL — SIGNIFICANT CHANGE UP (ref 8.4–10.5)
CHLORIDE SERPL-SCNC: 109 MMOL/L — HIGH (ref 96–108)
CHOLEST SERPL-MCNC: 212 MG/DL — HIGH (ref 10–199)
CK SERPL-CCNC: 272 U/L — HIGH (ref 21–215)
CO2 SERPL-SCNC: 27 MMOL/L — SIGNIFICANT CHANGE UP (ref 22–31)
CREAT SERPL-MCNC: 0.92 MG/DL — SIGNIFICANT CHANGE UP (ref 0.5–1.3)
EOSINOPHIL # BLD AUTO: 0.02 K/UL — SIGNIFICANT CHANGE UP (ref 0–0.5)
EOSINOPHIL NFR BLD AUTO: 0.3 % — SIGNIFICANT CHANGE UP (ref 0–6)
ETHANOL SERPL-MCNC: <3 MG/DL — SIGNIFICANT CHANGE UP (ref 0–10)
FOLATE SERPL-MCNC: 17.5 NG/ML — SIGNIFICANT CHANGE UP
GLUCOSE BLDC GLUCOMTR-MCNC: 102 MG/DL — HIGH (ref 70–99)
GLUCOSE BLDC GLUCOMTR-MCNC: 123 MG/DL — HIGH (ref 70–99)
GLUCOSE BLDC GLUCOMTR-MCNC: 209 MG/DL — HIGH (ref 70–99)
GLUCOSE BLDC GLUCOMTR-MCNC: 89 MG/DL — SIGNIFICANT CHANGE UP (ref 70–99)
GLUCOSE SERPL-MCNC: 110 MG/DL — HIGH (ref 70–99)
HAV IGM SER-ACNC: SIGNIFICANT CHANGE UP
HBA1C BLD-MCNC: 8.9 % — HIGH (ref 4–5.6)
HBV CORE IGM SER-ACNC: SIGNIFICANT CHANGE UP
HBV SURFACE AG SER-ACNC: SIGNIFICANT CHANGE UP
HCT VFR BLD CALC: 36.4 % — SIGNIFICANT CHANGE UP (ref 34.5–45)
HCV AB S/CO SERPL IA: 0.12 S/CO — SIGNIFICANT CHANGE UP (ref 0–0.99)
HCV AB SERPL-IMP: SIGNIFICANT CHANGE UP
HDLC SERPL-MCNC: 106 MG/DL — SIGNIFICANT CHANGE UP
HGB BLD-MCNC: 11.5 G/DL — SIGNIFICANT CHANGE UP (ref 11.5–15.5)
IMM GRANULOCYTES NFR BLD AUTO: 0.3 % — SIGNIFICANT CHANGE UP (ref 0–1.5)
LIPID PNL WITH DIRECT LDL SERPL: 94 MG/DL — SIGNIFICANT CHANGE UP
LYMPHOCYTES # BLD AUTO: 0.31 K/UL — LOW (ref 1–3.3)
LYMPHOCYTES # BLD AUTO: 4 % — LOW (ref 13–44)
MAGNESIUM SERPL-MCNC: 2.3 MG/DL — SIGNIFICANT CHANGE UP (ref 1.6–2.6)
MCHC RBC-ENTMCNC: 28.5 PG — SIGNIFICANT CHANGE UP (ref 27–34)
MCHC RBC-ENTMCNC: 31.6 GM/DL — LOW (ref 32–36)
MCV RBC AUTO: 90.1 FL — SIGNIFICANT CHANGE UP (ref 80–100)
MONOCYTES # BLD AUTO: 0.69 K/UL — SIGNIFICANT CHANGE UP (ref 0–0.9)
MONOCYTES NFR BLD AUTO: 8.9 % — SIGNIFICANT CHANGE UP (ref 2–14)
NEUTROPHILS # BLD AUTO: 6.69 K/UL — SIGNIFICANT CHANGE UP (ref 1.8–7.4)
NEUTROPHILS NFR BLD AUTO: 86.4 % — HIGH (ref 43–77)
NRBC # BLD: 0 /100 WBCS — SIGNIFICANT CHANGE UP (ref 0–0)
PHOSPHATE SERPL-MCNC: 2.8 MG/DL — SIGNIFICANT CHANGE UP (ref 2.5–4.5)
PLATELET # BLD AUTO: 228 K/UL — SIGNIFICANT CHANGE UP (ref 150–400)
POTASSIUM SERPL-MCNC: 3.2 MMOL/L — LOW (ref 3.5–5.3)
POTASSIUM SERPL-SCNC: 3.2 MMOL/L — LOW (ref 3.5–5.3)
PROT SERPL-MCNC: 5.6 G/DL — LOW (ref 6–8.3)
RBC # BLD: 4.04 M/UL — SIGNIFICANT CHANGE UP (ref 3.8–5.2)
RBC # FLD: 14.4 % — SIGNIFICANT CHANGE UP (ref 10.3–14.5)
SODIUM SERPL-SCNC: 145 MMOL/L — SIGNIFICANT CHANGE UP (ref 135–145)
T PALLIDUM AB TITR SER: NEGATIVE — SIGNIFICANT CHANGE UP
TOTAL CHOLESTEROL/HDL RATIO MEASUREMENT: 2 RATIO — LOW (ref 3.3–7.1)
TRIGL SERPL-MCNC: 60 MG/DL — SIGNIFICANT CHANGE UP (ref 10–149)
TSH SERPL-MCNC: 1.41 UU/ML — SIGNIFICANT CHANGE UP (ref 0.34–4.82)
VIT B12 SERPL-MCNC: 1104 PG/ML — SIGNIFICANT CHANGE UP (ref 232–1245)
WBC # BLD: 7.74 K/UL — SIGNIFICANT CHANGE UP (ref 3.8–10.5)
WBC # FLD AUTO: 7.74 K/UL — SIGNIFICANT CHANGE UP (ref 3.8–10.5)

## 2020-02-26 PROCEDURE — 93970 EXTREMITY STUDY: CPT | Mod: 26

## 2020-02-26 PROCEDURE — 99222 1ST HOSP IP/OBS MODERATE 55: CPT

## 2020-02-26 PROCEDURE — 99233 SBSQ HOSP IP/OBS HIGH 50: CPT | Mod: GC

## 2020-02-26 RX ORDER — POTASSIUM CHLORIDE 20 MEQ
40 PACKET (EA) ORAL EVERY 4 HOURS
Refills: 0 | Status: COMPLETED | OUTPATIENT
Start: 2020-02-26 | End: 2020-02-26

## 2020-02-26 RX ADMIN — HEPARIN SODIUM 5000 UNIT(S): 5000 INJECTION INTRAVENOUS; SUBCUTANEOUS at 15:10

## 2020-02-26 RX ADMIN — INSULIN GLARGINE 8 UNIT(S): 100 INJECTION, SOLUTION SUBCUTANEOUS at 21:17

## 2020-02-26 RX ADMIN — ATORVASTATIN CALCIUM 20 MILLIGRAM(S): 80 TABLET, FILM COATED ORAL at 21:17

## 2020-02-26 RX ADMIN — Medication 81 MILLIGRAM(S): at 12:43

## 2020-02-26 RX ADMIN — HEPARIN SODIUM 5000 UNIT(S): 5000 INJECTION INTRAVENOUS; SUBCUTANEOUS at 21:17

## 2020-02-26 RX ADMIN — Medication 40 MILLIEQUIVALENT(S): at 12:43

## 2020-02-26 RX ADMIN — SODIUM CHLORIDE 100 MILLILITER(S): 9 INJECTION, SOLUTION INTRAVENOUS at 08:42

## 2020-02-26 RX ADMIN — Medication 40 MILLIEQUIVALENT(S): at 08:37

## 2020-02-26 RX ADMIN — HEPARIN SODIUM 5000 UNIT(S): 5000 INJECTION INTRAVENOUS; SUBCUTANEOUS at 05:56
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
## 2020-02-26 NOTE — CONSULT NOTE ADULT - ASSESSMENT
A/P: syncope- generalized seizure vs stroke    - No IV tpa given because TIME OF ONSET IS UNKNOWN…  - ASA/ PLavix  - Statin  - Dysphagia screen  - DVT prophylaxia  - MRI/A brain wo contrast  - EEG  - Duplex doppler carotids  - PT eval A/P: syncope- generalized seizure vs stroke Vascular dementia Reviewed images of CT ; she has evidence consistent with cerebrovascular microangiopathy; in this context, her memory and confusion is likely due to vascular dementia. seizures and post ictal encephalopathy ; EEG and MRI will help resolve the diagnsois  - No IV tpa given because TIME OF ONSET IS UNKNOWN…  - ASA/ PLavix  - Statin  - Dysphagia screen  - DVT prophylaxis  - MRI/A brain wo contrast  - EEG  - Duplex doppler carotids  - PT eval

## 2020-02-26 NOTE — SWALLOW BEDSIDE ASSESSMENT ADULT - SWALLOW EVAL: DIAGNOSIS
Pt p/w adequate oral & pharygneal phases of chew & swallow for tolerating moist/soft-solids diet: Good labial seal, Functional bolus manipulation & propulsion, Unremarkable oral phase; Timely and efficient oropharyngeal swallow with no overt s/s of laryngeal penetration or aspiration at this exam.

## 2020-02-26 NOTE — CONSULT NOTE ADULT - PROBLEM SELECTOR RECOMMENDATION 3
-c/w Sliding scale ACHS  -monitor pre meal finger sticks  -Fuerther management per primary team -Resolved with IV hydration

## 2020-02-26 NOTE — SWALLOW BEDSIDE ASSESSMENT ADULT - SWALLOW EVAL: RECOMMENDED FEEDING/EATING TECHNIQUES
alternate food with liquid/position upright (90 degrees)/allow for swallow between intakes/oral hygiene

## 2020-02-26 NOTE — CONSULT NOTE ADULT - ASSESSMENT
Patient is an 84 year old female, with PMH of colon cancer s/p colectomy, DM and OA, who comes in due to unwitnessed fall found on floor by occupational therapist.     Cardiology consulted with concern for unwitnessed fall

## 2020-02-26 NOTE — PROGRESS NOTE ADULT - PROBLEM SELECTOR PLAN 5
c/w pain control as needed post fall  -Recommend attaining Rt shoulder/ elbow x rays as opt is having difficulty moving RUE.

## 2020-02-26 NOTE — CHART NOTE - NSCHARTNOTEFT_GEN_A_CORE
EVENT:   Patient with hx. syncope, s/p fall, weakness, HTN, HLD, DM, DM, on NPO and IV Fluid - LR, was given 4 units sc insulin Lantus x 1 dose (50%), and 8 units of Lantus sc HS dose - on hold.   OBJECTIVE:  Vital Signs Last 24 Hrs  T(C): 36.8 (25 Feb 2020 23:53), Max: 36.8 (25 Feb 2020 20:40)  T(F): 98.2 (25 Feb 2020 23:53), Max: 98.3 (25 Feb 2020 20:40)  HR: 93 (25 Feb 2020 23:53) (93 - 105)  BP: 145/51 (25 Feb 2020 23:53) (115/69 - 146/67)  BP(mean): --  RR: 17 (25 Feb 2020 23:53) (17 - 20)  SpO2: 96% (25 Feb 2020 23:53) (96% - 100%)    FOCUSED PHYSICAL EXAM:    LABS:                        13.4   9.67  )-----------( 264      ( 25 Feb 2020 14:58 )             41.6   CARDIAC MARKERS ( 25 Feb 2020 21:12 )  0.042 ng/mL / x     / 465 U/L / x     / 25.7 ng/mL  CARDIAC MARKERS ( 25 Feb 2020 14:58 )  0.017 ng/mL / x     / 746 U/L / x     / x        02-25    141  |  102  |  41<H>  ----------------------------<  194<H>  4.6   |  27  |  1.31<H>    Ca    10.0      25 Feb 2020 14:58    TPro  7.3  /  Alb  3.5  /  TBili  0.8  /  DBili  x   /  AST  82<H>  /  ALT  49  /  AlkPhos  140<H>  02-25      EKG:   IMAGING:    ASSESSMENT:  PLAN: Blood Glucose Point of care Testing Q6hrs, while on NPO.     FOLLOW UP / RESULT:

## 2020-02-26 NOTE — CONSULT NOTE ADULT - PROBLEM SELECTOR RECOMMENDATION 4
-c/w pain control as needed post fall  -Recommend attaining Rt shoulder/ elbow x rays as opt is having difficulty moving CLAY -c/w Sliding scale ACHS  -monitor pre meal finger sticks  -Fuerther management per primary team

## 2020-02-26 NOTE — CONSULT NOTE ADULT - PROBLEM SELECTOR RECOMMENDATION 2
- on admission likely from rhabdo from prolonged time on ground-now improved to 400 w/ hydration  -Recommend continuing gentle hydration and monitoring renal function

## 2020-02-26 NOTE — CONSULT NOTE ADULT - PROBLEM SELECTOR RECOMMENDATION 5
-c/w pain control as needed post fall  -Recommend attaining Rt shoulder/ elbow x rays as opt is having difficulty moving CLAY

## 2020-02-26 NOTE — CONSULT NOTE ADULT - PROBLEM SELECTOR RECOMMENDATION 9
-Pt reports falling at home when standing on book to open door.  Sounds like it was a mechanical fall, but it was unwitnessed and pt is a poor historian  -Pt presents with no focal neurological deficits   -EKG NSR  -CT head neg for acute intracranial process   -Echo Pending  -Recommend attaining Rt shoulder/ elbow x rays as opt is having difficulty moving RUE  -Neuro consult placed

## 2020-02-26 NOTE — PROGRESS NOTE ADULT - PROBLEM SELECTOR PLAN 1
Patient had unwitnessed fall, most likely mechanical from OA  she is c/o pain in R elbow and shoulder  She gives h/o multiple near syncopal episodes  echo was done which was normal  tele did not show any event  tele was d/c   f/u xray of R shoulder and elbow

## 2020-02-26 NOTE — SWALLOW BEDSIDE ASSESSMENT ADULT - SLP PERTINENT HISTORY OF CURRENT PROBLEM
84 year old female, with PMH of colon cancer s/p colectomy, DM and OA, who comes in due to unwitnessed fall found on floor. CT head negative

## 2020-02-26 NOTE — PROGRESS NOTE ADULT - SUBJECTIVE AND OBJECTIVE BOX
PGY 1 Note discussed with supervising resident and primary attending    Patient is a 84y old  Female who presents with a chief complaint of syncope (2020 09:27)      INTERVAL HPI/OVERNIGHT EVENTS: echo was normal, tele did not show any event. tele was d/c  She complained of pain in the R elbow, R shoulder, Xray was ordered  She is also having swelling of b/l legs, will do venous doppler      MEDICATIONS  (STANDING):  aspirin enteric coated 81 milliGRAM(s) Oral daily  atorvastatin 20 milliGRAM(s) Oral at bedtime  heparin  Injectable 5000 Unit(s) SubCutaneous every 8 hours  insulin glargine Injectable (LANTUS) 8 Unit(s) SubCutaneous at bedtime  insulin lispro (HumaLOG) corrective regimen sliding scale   SubCutaneous three times a day before meals  lactated ringers. 1000 milliLiter(s) (100 mL/Hr) IV Continuous <Continuous>    MEDICATIONS  (PRN):      __________________________________________________  REVIEW OF SYSTEMS:    CONSTITUTIONAL: No fever,   EYES: no acute visual disturbances  NECK: No pain or stiffness  RESPIRATORY: No cough; No shortness of breath  CARDIOVASCULAR: No chest pain, no palpitations  GASTROINTESTINAL: No pain. No nausea or vomiting; No diarrhea   NEUROLOGICAL: No headache or numbness, no tremors  MUSCULOSKELETAL pain in the R elbow, shoulder  ENITOURINARY: no dysuria, no frequency, no hesitancy  PSYCHIATRY: no depression , no anxiety  ALL OTHER  ROS negative        Vital Signs Last 24 Hrs  T(C): 36.6 (2020 16:37), Max: 37 (2020 04:33)  T(F): 97.8 (2020 16:37), Max: 98.6 (2020 04:33)  HR: 75 (2020 16:37) (75 - 95)  BP: 139/89 (2020 16:37) (123/68 - 150/63)  BP(mean): --  RR: 18 (2020 16:37) (17 - 20)  SpO2: 98% (2020 16:37) (96% - 100%)    ________________________________________________  PHYSICAL EXAM:  GENERAL: NAD  HEENT: Normocephalic;  conjunctivae and sclerae clear; moist mucous membranes;   NECK : supple  CHEST/LUNG: Clear to auscultation bilaterally with good air entry   HEART: S1 S2  regular; no murmurs, gallops or rubs  ABDOMEN: Soft, Nontender, Nondistended; Bowel sounds present  EXTREMITIES: swelling, tenderness of R elbow and shoulder, swelling of b/l lower legs   SKIN: warm and dry; no rash  NERVOUS SYSTEM:  Awake and alert; Oriented  to place, person and time ; no new deficits    _________________________________________________  LABS:                        11.5   7.74  )-----------( 228      ( 2020 06:30 )             36.4     02-    145  |  109<H>  |  36<H>  ----------------------------<  110<H>  3.2<L>   |  27  |  0.92    Ca    9.3      2020 06:30  Phos  2.8       Mg     2.3     -    TPro  5.6<L>  /  Alb  2.7<L>  /  TBili  0.5  /  DBili  x   /  AST  45<H>  /  ALT  36  /  AlkPhos  109  02-26    PT/INR - ( 2020 14:58 )   PT: 10.7 sec;   INR: 0.97 ratio         PTT - ( 2020 14:58 )  PTT:28.5 sec  Urinalysis Basic - ( 2020 22:28 )    Color: Yellow / Appearance: Clear / S.025 / pH: x  Gluc: x / Ketone: Moderate  / Bili: Negative / Urobili: Negative   Blood: x / Protein: 100 / Nitrite: Negative   Leuk Esterase: Trace / RBC: 2-5 /HPF / WBC 3-5 /HPF   Sq Epi: x / Non Sq Epi: Few /HPF / Bacteria: Trace /HPF      CAPILLARY BLOOD GLUCOSE      POCT Blood Glucose.: 89 mg/dL (2020 12:16)  POCT Blood Glucose.: 123 mg/dL (2020 07:44)  POCT Blood Glucose.: 138 mg/dL (2020 22:31)        RADIOLOGY & ADDITIONAL TESTS:    Imaging Personally Reviewed:  YES/NO    Consultant(s) Notes Reviewed:   YES/ No    Care Discussed with Consultants :     Plan of care was discussed with patient and /or primary care giver; all questions and concerns were addressed and care was aligned with patient's wishes.

## 2020-02-27 LAB
ANION GAP SERPL CALC-SCNC: 5 MMOL/L — SIGNIFICANT CHANGE UP (ref 5–17)
BUN SERPL-MCNC: 22 MG/DL — HIGH (ref 7–18)
CALCIUM SERPL-MCNC: 8.8 MG/DL — SIGNIFICANT CHANGE UP (ref 8.4–10.5)
CHLORIDE SERPL-SCNC: 107 MMOL/L — SIGNIFICANT CHANGE UP (ref 96–108)
CK SERPL-CCNC: 170 U/L — SIGNIFICANT CHANGE UP (ref 21–215)
CO2 SERPL-SCNC: 30 MMOL/L — SIGNIFICANT CHANGE UP (ref 22–31)
CREAT SERPL-MCNC: 0.72 MG/DL — SIGNIFICANT CHANGE UP (ref 0.5–1.3)
CULTURE RESULTS: SIGNIFICANT CHANGE UP
GLUCOSE BLDC GLUCOMTR-MCNC: 232 MG/DL — HIGH (ref 70–99)
GLUCOSE BLDC GLUCOMTR-MCNC: 280 MG/DL — HIGH (ref 70–99)
GLUCOSE BLDC GLUCOMTR-MCNC: 74 MG/DL — SIGNIFICANT CHANGE UP (ref 70–99)
GLUCOSE BLDC GLUCOMTR-MCNC: 83 MG/DL — SIGNIFICANT CHANGE UP (ref 70–99)
GLUCOSE SERPL-MCNC: 97 MG/DL — SIGNIFICANT CHANGE UP (ref 70–99)
HCT VFR BLD CALC: 35.8 % — SIGNIFICANT CHANGE UP (ref 34.5–45)
HGB BLD-MCNC: 11.4 G/DL — LOW (ref 11.5–15.5)
MCHC RBC-ENTMCNC: 28.9 PG — SIGNIFICANT CHANGE UP (ref 27–34)
MCHC RBC-ENTMCNC: 31.8 GM/DL — LOW (ref 32–36)
MCV RBC AUTO: 90.6 FL — SIGNIFICANT CHANGE UP (ref 80–100)
NRBC # BLD: 0 /100 WBCS — SIGNIFICANT CHANGE UP (ref 0–0)
PLATELET # BLD AUTO: 203 K/UL — SIGNIFICANT CHANGE UP (ref 150–400)
POTASSIUM SERPL-MCNC: 3.5 MMOL/L — SIGNIFICANT CHANGE UP (ref 3.5–5.3)
POTASSIUM SERPL-SCNC: 3.5 MMOL/L — SIGNIFICANT CHANGE UP (ref 3.5–5.3)
RBC # BLD: 3.95 M/UL — SIGNIFICANT CHANGE UP (ref 3.8–5.2)
RBC # FLD: 14.6 % — HIGH (ref 10.3–14.5)
SODIUM SERPL-SCNC: 142 MMOL/L — SIGNIFICANT CHANGE UP (ref 135–145)
SPECIMEN SOURCE: SIGNIFICANT CHANGE UP
WBC # BLD: 4.77 K/UL — SIGNIFICANT CHANGE UP (ref 3.8–10.5)
WBC # FLD AUTO: 4.77 K/UL — SIGNIFICANT CHANGE UP (ref 3.8–10.5)

## 2020-02-27 PROCEDURE — 99232 SBSQ HOSP IP/OBS MODERATE 35: CPT | Mod: GC

## 2020-02-27 RX ORDER — ACETAMINOPHEN 500 MG
650 TABLET ORAL EVERY 6 HOURS
Refills: 0 | Status: DISCONTINUED | OUTPATIENT
Start: 2020-02-27 | End: 2020-03-03

## 2020-02-27 RX ADMIN — HEPARIN SODIUM 5000 UNIT(S): 5000 INJECTION INTRAVENOUS; SUBCUTANEOUS at 21:50

## 2020-02-27 RX ADMIN — Medication 81 MILLIGRAM(S): at 11:42

## 2020-02-27 RX ADMIN — HEPARIN SODIUM 5000 UNIT(S): 5000 INJECTION INTRAVENOUS; SUBCUTANEOUS at 13:11

## 2020-02-27 RX ADMIN — ATORVASTATIN CALCIUM 20 MILLIGRAM(S): 80 TABLET, FILM COATED ORAL at 21:50

## 2020-02-27 RX ADMIN — Medication 650 MILLIGRAM(S): at 01:33

## 2020-02-27 RX ADMIN — INSULIN GLARGINE 8 UNIT(S): 100 INJECTION, SOLUTION SUBCUTANEOUS at 21:50

## 2020-02-27 RX ADMIN — Medication 2: at 17:15

## 2020-02-27 RX ADMIN — Medication 650 MILLIGRAM(S): at 00:45

## 2020-02-27 RX ADMIN — HEPARIN SODIUM 5000 UNIT(S): 5000 INJECTION INTRAVENOUS; SUBCUTANEOUS at 05:12

## 2020-02-27 NOTE — PROGRESS NOTE ADULT - PROBLEM SELECTOR PLAN 5
-c/w pain control as needed post fall  -Recommend attaining Rt shoulder/ elbow x rays as opt is having difficulty moving RUE.

## 2020-02-27 NOTE — PROGRESS NOTE ADULT - PROBLEM SELECTOR PLAN 1
-Pt reports falling at home when standing on book to open door.  Sounds like it was a mechanical fall, but it was unwitnessed and pt is a poor historian  -Pt presents with no focal neurological deficits, but complains of chronic numbness to fingertips  -EKG NSR  -CT head neg for acute intracranial process   -Echo: Nml LVSF, GIDD  -Recommend attaining Rt shoulder/ elbow x rays as opt is having difficulty moving RUE  -Neuro consult placed- plan for MRI/MRA brain, and EEG -Pt reports falling at home when standing on book to open door.  Sounds like it was a mechanical fall, but it was unwitnessed and pt is a poor historian  -Pt presents with no acute focal neurological deficits, but complains of chronic numbness to fingertips  -EKG NSR  -CT head neg for acute intracranial process   -Echo: Nml LVSF, GIDD  -Recommend attaining Rt shoulder/ elbow x rays as opt is having difficulty moving RUE  -Neuro consult placed- plan for MRI/MRA brain, and EEG

## 2020-02-27 NOTE — PROGRESS NOTE ADULT - SUBJECTIVE AND OBJECTIVE BOX
PRESENTING CC: unwitnessed fall    SUBJ: No acute events reported overnight.    Today pt presents in no acute distress.  Pt found resting comfortably in bed.        PMH -reviewed admission note, no change since admission    MEDICATIONS  (STANDING):  aspirin enteric coated 81 milliGRAM(s) Oral daily  atorvastatin 20 milliGRAM(s) Oral at bedtime  heparin  Injectable 5000 Unit(s) SubCutaneous every 8 hours  insulin glargine Injectable (LANTUS) 8 Unit(s) SubCutaneous at bedtime  insulin lispro (HumaLOG) corrective regimen sliding scale   SubCutaneous three times a day before meals  lactated ringers. 1000 milliLiter(s) (100 mL/Hr) IV Continuous <Continuous>    MEDICATIONS  (PRN):  acetaminophen   Tablet .. 650 milliGRAM(s) Oral every 6 hours PRN Mild Pain (1 - 3)      REVIEW OF SYSTEMS:  Constitutional: [ ] fever, [ ]weight loss,  [ ]fatigue  Eyes: [ ] visual changes  Respiratory: [ ]shortness of breath;  [ ] cough, [ ]wheezing, [ ]chills, [ ]hemoptysis  Cardiovascular: [ ] chest pain, [ ]palpitations, [ ]dizziness,  [ ]leg swelling[ ]orthopnea[ ]PND  Gastrointestinal: [ ] abdominal pain, [ ]nausea, [ ]vomiting,  [ ]diarrhea   Genitourinary: [ ] dysuria, [ ] hematuria  Neurologic: [ ] headaches [ ] tremors[ ]weakness  Skin: [ ] itching, [ ]burning, [ ] rashes  Endocrine: [ ] heat or cold intolerance  Musculoskeletal: [ ] joint pain or swelling; [ ] muscle, back, or extremity pain  Psychiatric: [ ] depression, [ ]anxiety, [ ]mood swings, or [ ]difficulty sleeping  Hematologic: [ ] easy bruising, [ ] bleeding gums    [x] All remaining systems negative except as per above.   [ ]Unable to obtain.    Vital Signs Last 24 Hrs  T(C): 36.8 (27 Feb 2020 07:41), Max: 37.1 (26 Feb 2020 23:25)  T(F): 98.2 (27 Feb 2020 07:41), Max: 98.7 (26 Feb 2020 23:25)  HR: 61 (27 Feb 2020 07:41) (61 - 92)  BP: 146/57 (27 Feb 2020 07:41) (126/52 - 157/60)  BP(mean): --  RR: 18 (27 Feb 2020 07:41) (17 - 18)  SpO2: 97% (27 Feb 2020 07:41) (95% - 98%)  I&O's Summary    26 Feb 2020 07:01  -  27 Feb 2020 07:00  --------------------------------------------------------  IN: 1000 mL / OUT: 875 mL / NET: 125 mL        PHYSICAL EXAM:  General: No acute distress BMI-  HEENT: EOMI, PERRL  Neck: Supple, [ ] JVD  Lungs: Equal air entry bilaterally; [ ] rales [ ] wheezing [ ] rhonchi  Heart: Regular rate and rhythm; [ ] murmur   /6 [ ] systolic [ ] diastolic [ ] radiation[ ] rubs [ ]  gallops  Abdomen: Nontender, bowel sounds present  Extremities: No clubbing, cyanosis, [ ] edema  Nervous system:  Alert & Oriented X3, no focal deficits  Psychiatric: Normal affect  Skin: No rashes or lesions    LABS:  02-27    142  |  107  |  22<H>  ----------------------------<  97  3.5   |  30  |  0.72    Ca    8.8      27 Feb 2020 06:26  Phos  2.8     02-26  Mg     2.3     02-26    TPro  5.6<L>  /  Alb  2.7<L>  /  TBili  0.5  /  DBili  x   /  AST  45<H>  /  ALT  36  /  AlkPhos  109  02-26    Creatinine Trend: 0.72<--, 0.92<--, 1.31<--                        11.4   4.77  )-----------( 203      ( 27 Feb 2020 06:26 )             35.8     PT/INR - ( 25 Feb 2020 14:58 )   PT: 10.7 sec;   INR: 0.97 ratio         PTT - ( 25 Feb 2020 14:58 )  PTT:28.5 sec  Lipid Panel:   Cardiac Enzymes: CARDIAC MARKERS ( 26 Feb 2020 15:40 )  x     / x     / 272 U/L / x     / x      CARDIAC MARKERS ( 25 Feb 2020 21:12 )  0.042 ng/mL / x     / 465 U/L / x     / 25.7 ng/mL  CARDIAC MARKERS ( 25 Feb 2020 14:58 )  0.017 ng/mL / x     / 746 U/L / x     / x                RADIOLOGY:    ECG [my interpretation]:    TELEMETRY:    ECHO:    STRESS TEST:    CATHETERIZATION:      IMPRESSION AND PLAN: PRESENTING CC: unwitnessed fall    SUBJ: No acute events reported overnight.    Today pt presents in no acute distress.  Pt found resting comfortably in bed.  Pt awaiting Echo result.          PMH -reviewed admission note, no change since admission    MEDICATIONS  (STANDING):  aspirin enteric coated 81 milliGRAM(s) Oral daily  atorvastatin 20 milliGRAM(s) Oral at bedtime  heparin  Injectable 5000 Unit(s) SubCutaneous every 8 hours  insulin glargine Injectable (LANTUS) 8 Unit(s) SubCutaneous at bedtime  insulin lispro (HumaLOG) corrective regimen sliding scale   SubCutaneous three times a day before meals  lactated ringers. 1000 milliLiter(s) (100 mL/Hr) IV Continuous <Continuous>    MEDICATIONS  (PRN):  acetaminophen   Tablet .. 650 milliGRAM(s) Oral every 6 hours PRN Mild Pain (1 - 3)      REVIEW OF SYSTEMS:  Constitutional: [ ] fever, [ ]weight loss,  [ ]fatigue  Eyes: [ ] visual changes  Respiratory: [ ]shortness of breath;  [ ] cough, [ ]wheezing, [ ]chills, [ ]hemoptysis  Cardiovascular: [ ] chest pain, [ ]palpitations, [ ]dizziness,  [ ]leg swelling[ ]orthopnea[ ]PND  Gastrointestinal: [ ] abdominal pain, [ ]nausea, [ ]vomiting,  [ ]diarrhea   Genitourinary: [ ] dysuria, [ ] hematuria  Neurologic: [ ] headaches [ ] tremors[ ]weakness  Skin: [ ] itching, [ ]burning, [ ] rashes  Endocrine: [ ] heat or cold intolerance  Musculoskeletal: [ ] joint pain or swelling; [ ] muscle, back, or extremity pain  Psychiatric: [ ] depression, [ ]anxiety, [ ]mood swings, or [ ]difficulty sleeping  Hematologic: [ ] easy bruising, [ ] bleeding gums    [x] All remaining systems negative except as per above.   [ ]Unable to obtain.    Vital Signs Last 24 Hrs  T(C): 36.8 (27 Feb 2020 07:41), Max: 37.1 (26 Feb 2020 23:25)  T(F): 98.2 (27 Feb 2020 07:41), Max: 98.7 (26 Feb 2020 23:25)  HR: 61 (27 Feb 2020 07:41) (61 - 92)  BP: 146/57 (27 Feb 2020 07:41) (126/52 - 157/60)  BP(mean): --  RR: 18 (27 Feb 2020 07:41) (17 - 18)  SpO2: 97% (27 Feb 2020 07:41) (95% - 98%)  I&O's Summary    26 Feb 2020 07:01  -  27 Feb 2020 07:00  --------------------------------------------------------  IN: 1000 mL / OUT: 875 mL / NET: 125 mL        PHYSICAL EXAM:  General: No acute distress BMI-  HEENT: EOMI, PERRL  Neck: Supple, [ ] JVD  Lungs: Equal air entry bilaterally; [ ] rales [ ] wheezing [ ] rhonchi  Heart: Regular rate and rhythm; [ ] murmur   /6 [ ] systolic [ ] diastolic [ ] radiation[ ] rubs [ ]  gallops  Abdomen: Nontender, bowel sounds present  Extremities: No clubbing, cyanosis, [ ] edema  Nervous system:  Alert & Oriented X3, no focal deficits  Psychiatric: Normal affect  Skin: No rashes or lesions    LABS:  02-27    142  |  107  |  22<H>  ----------------------------<  97  3.5   |  30  |  0.72    Ca    8.8      27 Feb 2020 06:26  Phos  2.8     02-26  Mg     2.3     02-26    TPro  5.6<L>  /  Alb  2.7<L>  /  TBili  0.5  /  DBili  x   /  AST  45<H>  /  ALT  36  /  AlkPhos  109  02-26    Creatinine Trend: 0.72<--, 0.92<--, 1.31<--                        11.4   4.77  )-----------( 203      ( 27 Feb 2020 06:26 )             35.8     PT/INR - ( 25 Feb 2020 14:58 )   PT: 10.7 sec;   INR: 0.97 ratio         PTT - ( 25 Feb 2020 14:58 )  PTT:28.5 sec  Lipid Panel:   Cardiac Enzymes: CARDIAC MARKERS ( 26 Feb 2020 15:40 )  x     / x     / 272 U/L / x     / x      CARDIAC MARKERS ( 25 Feb 2020 21:12 )  0.042 ng/mL / x     / 465 U/L / x     / 25.7 ng/mL  CARDIAC MARKERS ( 25 Feb 2020 14:58 )  0.017 ng/mL / x     / 746 U/L / x     / x                RADIOLOGY:    ECG [my interpretation]:    TELEMETRY:    ECHO:    STRESS TEST:    CATHETERIZATION:      IMPRESSION AND PLAN:

## 2020-02-27 NOTE — PROGRESS NOTE ADULT - SUBJECTIVE AND OBJECTIVE BOX
PGY 1 Note discussed with supervising resident and primary attending    Patient is a 84y old  Female who presents with a chief complaint of syncope (2020 09:13)      INTERVAL HPI/OVERNIGHT EVENTS:   Patient is complaining of pain in r elbow and and shoulder. Still pending xray  Spoken to the family who states that she has been AOx3 but not well aware of the medical status  Plan is to get psych consult to decide if she has capacity to take her own decision  Family would like her to go to assisted living  PT is pending for the xray r/o fracture    MEDICATIONS  (STANDING):  aspirin enteric coated 81 milliGRAM(s) Oral daily  atorvastatin 20 milliGRAM(s) Oral at bedtime  heparin  Injectable 5000 Unit(s) SubCutaneous every 8 hours  insulin glargine Injectable (LANTUS) 8 Unit(s) SubCutaneous at bedtime  insulin lispro (HumaLOG) corrective regimen sliding scale   SubCutaneous three times a day before meals  lactated ringers. 1000 milliLiter(s) (100 mL/Hr) IV Continuous <Continuous>    MEDICATIONS  (PRN):  acetaminophen   Tablet .. 650 milliGRAM(s) Oral every 6 hours PRN Mild Pain (1 - 3)      __________________________________________________  REVIEW OF SYSTEMS:    CONSTITUTIONAL: No fever,   EYES: no acute visual disturbances  NECK: No pain or stiffness  RESPIRATORY: No cough; No shortness of breath  CARDIOVASCULAR: No chest pain, no palpitations  GASTROINTESTINAL: No pain. No nausea or vomiting; No diarrhea   NEUROLOGICAL: No headache or numbness, no tremors  MUSCULOSKELETAL: pain in r elbow and arm  GENITOURINARY: no dysuria, no frequency, no hesitancy  PSYCHIATRY: no depression , no anxiety  ALL OTHER  ROS negative        Vital Signs Last 24 Hrs  T(C): 37 (2020 11:00), Max: 37.1 (2020 23:25)  T(F): 98.6 (2020 11:00), Max: 98.7 (2020 23:25)  HR: 64 (2020 11:00) (61 - 92)  BP: 158/90 (2020 11:00) (131/51 - 158/90)  BP(mean): --  RR: 18 (2020 11:00) (17 - 18)  SpO2: 97% (2020 11:00) (95% - 98%)    ________________________________________________  PHYSICAL EXAM:  GENERAL: NAD  HEENT: Normocephalic;  conjunctivae and sclerae clear; moist mucous membranes;   NECK : supple  CHEST/LUNG: Clear to auscultation bilaterally with good air entry   HEART: S1 S2  regular; no murmurs, gallops or rubs  ABDOMEN: Soft, Nontender, Nondistended; Bowel sounds present  EXTREMITIES:pain swelling ecchymosis of r arm  SKIN: warm and dry; no rash  NERVOUS SYSTEM:  Awake and alert; Oriented  to place, person and time ; no new deficits    _________________________________________________  LABS:                        11.4   4.77  )-----------( 203      ( 2020 06:26 )             35.8     02-27    142  |  107  |  22<H>  ----------------------------<  97  3.5   |  30  |  0.72    Ca    8.8      2020 06:26  Phos  2.8     -  Mg     2.3     -26    TPro  5.6<L>  /  Alb  2.7<L>  /  TBili  0.5  /  DBili  x   /  AST  45<H>  /  ALT  36  /  AlkPhos  109  02-26    PT/INR - ( 2020 14:58 )   PT: 10.7 sec;   INR: 0.97 ratio         PTT - ( 2020 14:58 )  PTT:28.5 sec  Urinalysis Basic - ( 2020 22:28 )    Color: Yellow / Appearance: Clear / S.025 / pH: x  Gluc: x / Ketone: Moderate  / Bili: Negative / Urobili: Negative   Blood: x / Protein: 100 / Nitrite: Negative   Leuk Esterase: Trace / RBC: 2-5 /HPF / WBC 3-5 /HPF   Sq Epi: x / Non Sq Epi: Few /HPF / Bacteria: Trace /HPF      CAPILLARY BLOOD GLUCOSE      POCT Blood Glucose.: 74 mg/dL (2020 11:16)  POCT Blood Glucose.: 83 mg/dL (2020 07:37)  POCT Blood Glucose.: 209 mg/dL (2020 20:58)  POCT Blood Glucose.: 102 mg/dL (2020 16:53)        RADIOLOGY & ADDITIONAL TESTS:    Imaging Personally Reviewed:  YES/NO    Consultant(s) Notes Reviewed:   YES/ No    Care Discussed with Consultants :     Plan of care was discussed with patient and /or primary care giver; all questions and concerns were addressed and care was aligned with patient's wishes.

## 2020-02-27 NOTE — PROGRESS NOTE ADULT - PROBLEM SELECTOR PLAN 2
-Resolved   - on admission likely from rhabdo from prolonged time on ground-now improved to 200 w/ hydration  -Recommend continuing gentle hydration and monitoring renal function.

## 2020-02-28 DIAGNOSIS — Z65.9 PROBLEM RELATED TO UNSPECIFIED PSYCHOSOCIAL CIRCUMSTANCES: ICD-10-CM

## 2020-02-28 DIAGNOSIS — F43.22 ADJUSTMENT DISORDER WITH ANXIETY: ICD-10-CM

## 2020-02-28 DIAGNOSIS — F42.3 HOARDING DISORDER: ICD-10-CM

## 2020-02-28 DIAGNOSIS — Z04.6 ENCOUNTER FOR GENERAL PSYCHIATRIC EXAMINATION, REQUESTED BY AUTHORITY: ICD-10-CM

## 2020-02-28 LAB
ANION GAP SERPL CALC-SCNC: 4 MMOL/L — LOW (ref 5–17)
BUN SERPL-MCNC: 21 MG/DL — HIGH (ref 7–18)
CALCIUM SERPL-MCNC: 8.7 MG/DL — SIGNIFICANT CHANGE UP (ref 8.4–10.5)
CHLORIDE SERPL-SCNC: 106 MMOL/L — SIGNIFICANT CHANGE UP (ref 96–108)
CO2 SERPL-SCNC: 32 MMOL/L — HIGH (ref 22–31)
CREAT SERPL-MCNC: 0.8 MG/DL — SIGNIFICANT CHANGE UP (ref 0.5–1.3)
GLUCOSE BLDC GLUCOMTR-MCNC: 139 MG/DL — HIGH (ref 70–99)
GLUCOSE BLDC GLUCOMTR-MCNC: 255 MG/DL — HIGH (ref 70–99)
GLUCOSE BLDC GLUCOMTR-MCNC: 319 MG/DL — HIGH (ref 70–99)
GLUCOSE BLDC GLUCOMTR-MCNC: 338 MG/DL — HIGH (ref 70–99)
GLUCOSE SERPL-MCNC: 174 MG/DL — HIGH (ref 70–99)
HCT VFR BLD CALC: 39.1 % — SIGNIFICANT CHANGE UP (ref 34.5–45)
HGB BLD-MCNC: 12.2 G/DL — SIGNIFICANT CHANGE UP (ref 11.5–15.5)
MCHC RBC-ENTMCNC: 28.9 PG — SIGNIFICANT CHANGE UP (ref 27–34)
MCHC RBC-ENTMCNC: 31.2 GM/DL — LOW (ref 32–36)
MCV RBC AUTO: 92.7 FL — SIGNIFICANT CHANGE UP (ref 80–100)
NRBC # BLD: 0 /100 WBCS — SIGNIFICANT CHANGE UP (ref 0–0)
PLATELET # BLD AUTO: 184 K/UL — SIGNIFICANT CHANGE UP (ref 150–400)
POTASSIUM SERPL-MCNC: 3.6 MMOL/L — SIGNIFICANT CHANGE UP (ref 3.5–5.3)
POTASSIUM SERPL-SCNC: 3.6 MMOL/L — SIGNIFICANT CHANGE UP (ref 3.5–5.3)
RBC # BLD: 4.22 M/UL — SIGNIFICANT CHANGE UP (ref 3.8–5.2)
RBC # FLD: 14.5 % — SIGNIFICANT CHANGE UP (ref 10.3–14.5)
SODIUM SERPL-SCNC: 142 MMOL/L — SIGNIFICANT CHANGE UP (ref 135–145)
WBC # BLD: 5.01 K/UL — SIGNIFICANT CHANGE UP (ref 3.8–10.5)
WBC # FLD AUTO: 5.01 K/UL — SIGNIFICANT CHANGE UP (ref 3.8–10.5)

## 2020-02-28 PROCEDURE — 99233 SBSQ HOSP IP/OBS HIGH 50: CPT | Mod: GC

## 2020-02-28 PROCEDURE — 99223 1ST HOSP IP/OBS HIGH 75: CPT

## 2020-02-28 RX ADMIN — HEPARIN SODIUM 5000 UNIT(S): 5000 INJECTION INTRAVENOUS; SUBCUTANEOUS at 21:54

## 2020-02-28 RX ADMIN — Medication 4: at 13:12

## 2020-02-28 RX ADMIN — INSULIN GLARGINE 8 UNIT(S): 100 INJECTION, SOLUTION SUBCUTANEOUS at 22:49

## 2020-02-28 RX ADMIN — Medication 3: at 17:06

## 2020-02-28 RX ADMIN — HEPARIN SODIUM 5000 UNIT(S): 5000 INJECTION INTRAVENOUS; SUBCUTANEOUS at 13:15

## 2020-02-28 RX ADMIN — ATORVASTATIN CALCIUM 20 MILLIGRAM(S): 80 TABLET, FILM COATED ORAL at 21:54

## 2020-02-28 RX ADMIN — HEPARIN SODIUM 5000 UNIT(S): 5000 INJECTION INTRAVENOUS; SUBCUTANEOUS at 05:54

## 2020-02-28 NOTE — DISCHARGE NOTE PROVIDER - CARE PROVIDER_API CALL
Bev Francis (MD)  Clinical Neurophysiology; Neurology  [address] [facility], [address] [place] [address]  Phone: [phone]  Fax: [phone]  Follow Up Time: Nicki Castellanos)  Neurology  [address] [facility], [address] [place] [address]  Phone: [phone]  Fax: [phone]  Follow Up Time:     Aston Avila)  Orthopaedic Surgery; Sports Medicine  [address] [place] [address], [address] [place] [address]  Phone: [phone]  Fax: [phone]  Follow Up Time:

## 2020-02-28 NOTE — BEHAVIORAL HEALTH ASSESSMENT NOTE - NSBHSOCIALHXDETAILSFT_PSY_A_CORE
B/R in [place] with 2 siblings (sister and older brother who  when pt was 6). Formerly , but  collapsed and  suddenly at home in . No children. Worked P/T as , but retired @age 80. Lives alone in own apt in [place].

## 2020-02-28 NOTE — BEHAVIORAL HEALTH ASSESSMENT NOTE - RISK ASSESSMENT
Low Acute Suicide Risk Risk factors: Older age,  status. Protective factors: Absent h/o SI/SA/SIB, stable domicile, supportive family, attachment to independence. Risk level appears low at the time of assessment.

## 2020-02-28 NOTE — BEHAVIORAL HEALTH ASSESSMENT NOTE - OTHER
Not ambulating "Not being listened to" Preoccupied with "sorting through my paperwork" and cleaning apartment herself See above

## 2020-02-28 NOTE — PROGRESS NOTE ADULT - SUBJECTIVE AND OBJECTIVE BOX
Progress Note - Hand Therapy    Current Date:  3/1/17  Reporting Period: 12/26/17 to 3/1/17  Referring Physician: Dr. Nicholas MARTE Gael is a 59 year old right hand dominant female.    Diagnosis:Right Distal Radius Fracture  DOI:  9/10/16  DOS:  9/27/16  Procedure: Open reduction internal fixation right intraarticular distal radius fracture, greater than 3 parts with the use of corticocancellous bone graft.    Post: 5 months     Patient reports symptoms of pain, stiffness/loss of motion, weakness/loss of strength and edema of the right wrist and hand  which occurred due to a fall on gravel and landed on right wrist.     S:  Subjective changes as noted by patient: The wrist is about the same.  Still have trouble with twisting motions (put on seat belt and snapping my bra)  Functional changes noted by patient:  Patient reports she can do everything in her daily life except with certain twisting tasks as stated above    Upper Extremity Functional Index Score:  SCORE:   Column Totals: /80: 69   (A lower score indicates greater disability.)    O:  Pain:    VAS(0-10) 10/3/16 11/9/16 11/16/16 12/7/16 12/26/16 1/16/17 1/30/17 3/1/17   At Rest:  3/10 1/10 1/10 3/10 2/10 2/10 2/10 0/10   With Use:  5/10 2/10 2/10 3/10 2/10 2/10 2/10 1/10     Report of Pain:  Location: right ulnar side of wrist   Description: Ache, soreness  Frequency:  Occasional  Duration:  Worse during the day  Exacerbated by:  activity  Relieved by, ice,  rest  Progression:  improved    ROM:  Right Left Right Right Right Right Right Right Right Right Right Right Right Right   Wrist AROM  (PROM) 10/3/16 10/12/16 10/26/16 11/16/16  post 11/23/16  post 11/31/16 12/7/16  post 12/12/16  post 12/26/16 1/16/17 1/30/17 3/1/17   70 Ext -5 5 30 50 55 35 pre  55 post 55 60 45 pre  60 post 65 post 58 pre  62 58 pre  65 post   85 Flex 15 15 20 35 45 30 pre   45 post 40 post  50 post US 47 post  55 post US 40 pre  50 post 50 post  55 post us 40 pre  50 post  55  PGY 1 Note discussed with supervising resident and primary attending    Patient is a 84y old  Female who presents with a chief complaint of syncope (27 Feb 2020 14:26)      INTERVAL HPI/OVERNIGHT EVENTS: offers no new complaints; current symptoms resolving    MEDICATIONS  (STANDING):  aspirin enteric coated 81 milliGRAM(s) Oral daily  atorvastatin 20 milliGRAM(s) Oral at bedtime  heparin  Injectable 5000 Unit(s) SubCutaneous every 8 hours  insulin glargine Injectable (LANTUS) 8 Unit(s) SubCutaneous at bedtime  insulin lispro (HumaLOG) corrective regimen sliding scale   SubCutaneous three times a day before meals  lactated ringers. 1000 milliLiter(s) (100 mL/Hr) IV Continuous <Continuous>    MEDICATIONS  (PRN):  acetaminophen   Tablet .. 650 milliGRAM(s) Oral every 6 hours PRN Mild Pain (1 - 3)      __________________________________________________  REVIEW OF SYSTEMS:    CONSTITUTIONAL: No fever,   EYES: no acute visual disturbances  NECK: No pain or stiffness  RESPIRATORY: No cough; No shortness of breath  CARDIOVASCULAR: No chest pain, no palpitations  GASTROINTESTINAL: No pain. No nausea or vomiting; No diarrhea   NEUROLOGICAL: No headache or numbness, no tremors  MUSCULOSKELETAL: right shoulder pain   GENITOURINARY: no dysuria, no frequency, no hesitancy  PSYCHIATRY: no depression , no anxiety  ALL OTHER  ROS negative        Vital Signs Last 24 Hrs  T(C): 36.7 (28 Feb 2020 07:44), Max: 36.7 (28 Feb 2020 07:44)  T(F): 98 (28 Feb 2020 07:44), Max: 98 (28 Feb 2020 07:44)  HR: 66 (28 Feb 2020 07:44) (60 - 78)  BP: 149/69 (28 Feb 2020 07:44) (138/87 - 149/69)  BP(mean): --  RR: 16 (28 Feb 2020 07:44) (16 - 20)  SpO2: 96% (28 Feb 2020 07:44) (96% - 99%)    ________________________________________________  PHYSICAL EXAM:  GENERAL: NAD  HEENT: Normocephalic;  conjunctivae and sclerae clear; moist mucous membranes;   NECK : supple  CHEST/LUNG: Clear to auscultation bilaterally with good air entry   HEART: S1 S2  regular; no murmurs, gallops or rubs  ABDOMEN: Soft, Nontender, Nondistended; Bowel sounds present  EXTREMITIES: right shoulder pain , LROM   SKIN: warm and dry; no rash  NERVOUS SYSTEM:  Awake and alert; Oriented  to place, person; no new deficits    _________________________________________________  LABS:                        12.2   5.01  )-----------( 184      ( 28 Feb 2020 07:21 )             39.1     02-28    142  |  106  |  21<H>  ----------------------------<  174<H>  3.6   |  32<H>  |  0.80    Ca    8.7      28 Feb 2020 07:21          CAPILLARY BLOOD GLUCOSE      POCT Blood Glucose.: 139 mg/dL (28 Feb 2020 08:15)  POCT Blood Glucose.: 280 mg/dL (27 Feb 2020 21:15)  POCT Blood Glucose.: 232 mg/dL (27 Feb 2020 16:57)  POCT Blood Glucose.: 74 mg/dL (27 Feb 2020 11:16)        RADIOLOGY & ADDITIONAL TESTS:    Imaging Personally Reviewed:  YES/NO    Consultant(s) Notes Reviewed:   YES/ No    Care Discussed with Consultants :     Plan of care was discussed with patient and /or primary care giver; all questions and concerns were addressed and care was aligned with patient's wishes. post US   45 pre  60 post   25 RD  5  20 20 15 pre  23 post 20  20 pre  25 post 25 25 25   50 UD  5  20 25 15 pre  30 post 30  20 pre  35 post 35 30 pre  35 post 38 pre  40 post   90 Sup  30 40 65 70 65 pre   70 post 80 75 70 pre  80 post 80 post 65 pre  80 post 78 pre  87 post   85 Pron  20 40 75 80 70 pre  83 post 85 75 70 pre  83 post 85 post 80 pre 85     ROM:  Thumb 10/3/16 10/3/16 10/12/16 12/1/16   AROM(PROM) Right Left Right Right   MP / /     IP / /     RAbd / /     PAbd       Retropulsion       Opposition  [place] Opposition Scale (0-10/10) 7/10 10/10 8/10 9/10     Digits: able to make full fist    Strength:   (Measured in pounds) deferred at this time   11/9/16 11/23/16 12/7/16 12/26/16 1/16/17 1/30/16 3/1/17   Trials Right Left Right Left Right Left  Right Left Right Right Right   1  2  3  Av  14  14  13 45  48  50  47 16  19  18  18  25  23  20  23  22  23  23  23  28  30  28  29 36  34  32  34 37  38  40  38     Lat Pinch 11/9/16 12/7/16 12/26/16 1/16/17 3/1/17   Trials Right Left Right Left Right Left  Right Left Right   1  2  3  Av 13 10  10  10  10     3 Pt.  Pinch 11/9/16 12/7/16 12/26/16 1/16/17 3/1/17   Trials Right Left Right Left Right Left  Right Left Right   1  2  3  Avg: 3 11 6  6  10  10     Edema (circumference)   Distal Wrist Crease 10/3/16 10/26/16 12/7/16              Right 17.5 17.3 16.6               Left 15.5          Edema:  []     None   [x]     Mild    []     Moderate      []     Severe                  Location: right wrist     Scar:  []    NA           [x]  Adherent      []   Non-adherent       []   Raised     []   Flattened              Palpation:  [x]     Normal        []   Tender       []  Mild         []   Moderate []   Severe         Recommendations for Continued Therapy  Additions to Treatment Plan -  Therapeutic Exercise:  Isotonics and Stabilization    Please refer to the daily flowsheet for treatment provided today.     Assessment:  Response to therapy has  been improvement to:  ROM of Wrist:  All Planes  Flexibility:  less tightness in involved muscles  Strength:   and pinch  Pain:  frequency is less, intensity of pain is decreased, duration of pain is decreased and less tender over affected area    Overall Assessment:  Patient's symptoms are resolving.  Patient is becoming more independent in home exercise program  Patient would benefit from continued therapy to achieve rehab potential  STG/LTG:  STGoals have been reviewed and progress or achievement has occurred;  see goal sheet for details and updates.    I have re-evaluated this patient and find that the nature, scope, duration and intensity of the therapy is appropriate for the medical condition of the patient.  Plan:  Frequency/Duration:  Recommend continuing with the current treatment plan. 1 X every other week, once daily  for 6 visits    Home Program:   Warmth  AROM of wrist and forearm all planes  Forearm based wrist orthosis full time, remove for exercise and hygiene  Avoid activities that exacerbate pain   Scar mobilization  PROM for wrist E/F, UD/RD, and S/P   strengthening  Wrist PREs     Next Visit: 2 weeks  Heat  US  A/PROM of wrist and fingers  Strengthening of wrist and hand

## 2020-02-28 NOTE — BEHAVIORAL HEALTH ASSESSMENT NOTE - NSBHCHARTREVIEWINVESTIGATE_PSY_A_CORE FT
< from: 12 Lead ECG (02.25.20 @ 13:13) >    QTC Calculation(Bezet) 471 ms    P Axis 38 degrees    R Axis 44 degrees    T Axis 89 degrees    Diagnosis Line *** Poor data quality, interpretation may be adversely affected  Normal sinus rhythm  T wave abnormality, consider lateral ischemia  Abnormal ECG    < end of copied text >

## 2020-02-28 NOTE — DISCHARGE NOTE PROVIDER - NSDCMRMEDTOKEN_GEN_ALL_CORE_FT
ergocalciferol 50,000 intl units (1.25 mg) oral capsule: 1 cap(s) orally once a week  HumaLOG 100 units/mL injectable solution: 5 unit(s) injectable 3 times a day (before meals)  Lasix 40 mg oral tablet: 1 tab(s) orally once a day  Levemir FlexPen:   lisinopril 20 mg oral tablet: 1 tab(s) orally once a day  rosuvastatin 20 mg oral tablet: 1 tab(s) orally once a day acetaminophen 325 mg oral tablet: 2 tab(s) orally every 6 hours, As needed, Mild Pain (1 - 3)  ergocalciferol 50,000 intl units (1.25 mg) oral capsule: 1 cap(s) orally once a week  HumaLOG 100 units/mL injectable solution: 6 unit(s) injectable 3 times a day (before meals)  insulin detemir 100 units/mL subcutaneous solution: 8 unit(s) subcutaneous once a day (at bedtime)   Lasix 40 mg oral tablet: 1 tab(s) orally once a day  levETIRAcetam 500 mg oral tablet: 1 tab(s) orally 2 times a day  lisinopril 20 mg oral tablet: 1 tab(s) orally once a day  mirtazapine 7.5 mg oral tablet: 1 tab(s) orally once a day (at bedtime)   rosuvastatin 20 mg oral tablet: 1 tab(s) orally once a day  senna oral tablet: 2 tab(s) orally once a day (at bedtime) acetaminophen 325 mg oral tablet: 2 tab(s) orally every 6 hours, As needed, Mild Pain (1 - 3)  ergocalciferol 50,000 intl units (1.25 mg) oral capsule: 1 cap(s) orally once a week  Lasix 40 mg oral tablet: 1 tab(s) orally once a day  lisinopril 20 mg oral tablet: 1 tab(s) orally once a day  senna oral tablet: 2 tab(s) orally once a day (at bedtime)

## 2020-02-28 NOTE — BEHAVIORAL HEALTH ASSESSMENT NOTE - NSBHCHARTREVIEWLAB_PSY_A_CORE FT
02-28    142  |  106  |  21<H>  ----------------------------<  174<H>  3.6   |  32<H>  |  0.80    Ca    8.7      28 Feb 2020 07:21    Complete Blood Count in AM (02.28.20 @ 07:21)    Nucleated RBC: 0 /100 WBCs    WBC Count: 5.01 K/uL    RBC Count: 4.22 M/uL    Hemoglobin: 12.2 g/dL    Hematocrit: 39.1 %    Mean Cell Volume: 92.7 fl    Mean Cell Hemoglobin: 28.9 pg    Mean Cell Hemoglobin Conc: 31.2 gm/dL    Red Cell Distrib Width: 14.5 %    Platelet Count - Automated: 184 K/uL
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
## 2020-02-28 NOTE — PHYSICAL THERAPY INITIAL EVALUATION ADULT - CRITERIA FOR SKILLED THERAPEUTIC INTERVENTIONS
anticipated discharge recommendation/therapy frequency/predicted duration of therapy intervention/rehab potential/impairments found

## 2020-02-28 NOTE — DISCHARGE NOTE PROVIDER - CARE PROVIDERS DIRECT ADDRESSES
,[email or web address]

## 2020-02-28 NOTE — PROGRESS NOTE ADULT - ASSESSMENT
Patient is an 84 year old female, with PMH of colon cancer s/p colectomy, DM and OA, who comes in due to unwitnessed fall found on floor by occupational therapist.      Pending Xray for PT   pending psych consult for capacity assessment and hoarding issue  Family wants her to be placed in assisted living

## 2020-02-28 NOTE — PROGRESS NOTE ADULT - PROBLEM SELECTOR PLAN 6
Family wants her to be placed in assisted living in [place] with private payments   working with family

## 2020-02-28 NOTE — BEHAVIORAL HEALTH ASSESSMENT NOTE - NSBHCHARTREVIEWVS_PSY_A_CORE FT
Vital Signs Last 24 Hrs  T(C): 36.7 (28 Feb 2020 15:35), Max: 36.7 (28 Feb 2020 07:44)  T(F): 98 (28 Feb 2020 15:35), Max: 98 (28 Feb 2020 07:44)  HR: 93 (28 Feb 2020 15:35) (66 - 93)  BP: 159/78 (28 Feb 2020 15:35) (138/87 - 159/78)  BP(mean): --  RR: 17 (28 Feb 2020 15:35) (16 - 18)  SpO2: 97% (28 Feb 2020 15:35) (96% - 99%)

## 2020-02-28 NOTE — BEHAVIORAL HEALTH ASSESSMENT NOTE - NSBHCONSULTRECOMMENDOTHER_PSY_A_CORE FT
1. Recommend trial of mirtazapine 7.5 mg qhs to address insomnia and mood (pt can refuse the medication if she chooses)  2. Pt does NOT have capacity to determine her own dispo--next of kin should be approached for necessary consents  3. Continue delirium precautions: Frequent reorientation, familiar pictures and objects at bedside, natural light in daytime, consistent sleep/wake schedule, adequate hydration and nutrition, sensory aids (hearing aids, glasses) present if needed, minimizing noise and overstimulation, clustering care to minimize overnight interruptions, judicious use of deliriogenic medications (anticholinergics, benzodiazepines and opioid analgesics)  4. Psychiatry is signing off. Reconsult if additional issues arise as inpatient  5. Case d/w Dr. Camp of primary team    Zayra Jackson MD  Director, Consultation-Liaison Psychiatry Service  [id number]

## 2020-02-28 NOTE — PROGRESS NOTE ADULT - PROBLEM SELECTOR PLAN 1
Patient had unwitnessed fall, most likely mechanical from OA  she is c/o pain in R elbow and shoulder  She gives h/o multiple near syncopal episodes  echo was done which was normal  tele did not show any event  tele was d/c   f/u xray of R shoulder and elbow  PT eval pending

## 2020-02-28 NOTE — DISCHARGE NOTE PROVIDER - PROVIDER TOKENS
PROVIDER:[TOKEN:[[id number]]] PROVIDER:[TOKEN:[[id number]]],PROVIDER:[TOKEN:[[id number]]]

## 2020-02-28 NOTE — DISCHARGE NOTE PROVIDER - NSDCCPCAREPLAN_GEN_ALL_CORE_FT
PRINCIPAL DISCHARGE DIAGNOSIS  Diagnosis: Syncope  Assessment and Plan of Treatment: You came in after an episode of  unwitnessed fall  You were admitted to exclude any neurological or cardiological causes.   ECHO test was normal.   Your CT head was negative for any bleed.   Initially placed on telemetry to monitor for ny arrhythmias but it did not show any event and was discontinued   EEG of the brain was concerning for seizures.   Episode of fall likely due to seizure activity given focal spike on EEG, neurologist recommended that you should be started on anticonvulsant Keppra 500mg twice a day   MRI of your head was also performed which showedXXX   Seizure Precautions reviewed with you: Avoid activities that may cause self-harm if there is sudden loss of consciousness for at least 6 months, including: Operating heavy machinery, Standing at heights or near open flames, Bathing or Swimming alone  Please see your outpatient doctor to resume medications as necessary after completion of rehab.        SECONDARY DISCHARGE DIAGNOSES  Diagnosis: Encounter for general psychiatric examination, requested by authority  Assessment and Plan of Treatment: You were seen by psychiatrist, as per their evaluation you do not have capacity to care for yourself  safely at home, and does not have capacity to determine your own disposition, and next of kin (sister) should be approached for any necessary consents.  You were started on mirtazipine as per their recommendation to help with mood and sleep.    Diagnosis: JOANN (acute kidney injury)  Assessment and Plan of Treatment: Your creatinine was elevated on admission likely secondary to rhabdomyolysis which is muscle break down seen after falls. Your kidney functions improved after IV fluids were administered    Diagnosis: Diabetes mellitus  Assessment and Plan of Treatment: Continue with your blood sugar medication. HbAIC was found in admission on 8.9.  You must maintain a healthy diet that consist of low sugar, low fat, low sodium diet. Exercise frequently if possible. Consider repeating your Hemoglobin A1c within 3 months after discharge to monitor your average blood glucose control. Follow up with primary care physician in one week after discharge.      Diagnosis: Acute pain of right shoulder  Assessment and Plan of Treatment: you had complaints of right shoulder pain and had limited range of motion in that shoulder raising concerning for shoulder dislocation, we continued you on pain control as needed post fall. right shoulder/ elbow x rays showed xxxxx PRINCIPAL DISCHARGE DIAGNOSIS  Diagnosis: Syncope  Assessment and Plan of Treatment: You came in after an episode of  unwitnessed fall  You were admitted to exclude any neurological or cardiological causes.   ECHO test was normal.   Your CT head was negative for any bleed.   Initially placed on telemetry to monitor for ny arrhythmias but it did not show any event and was discontinued   EEG of the brain was concerning for seizures.   Episode of fall likely due to seizure activity given focal spike on EEG, neurologist recommended that you should be started on anticonvulsant Keppra 500mg twice a day   MRI of your head was also performed which showedXXX   Seizure Precautions reviewed with you: Avoid activities that may cause self-harm if there is sudden loss of consciousness for at least 6 months, including: Operating heavy machinery, Standing at heights or near open flames, Bathing or Swimming alone  Please see your outpatient doctor to resume medications as necessary after completion of rehab.        SECONDARY DISCHARGE DIAGNOSES  Diagnosis: Encounter for general psychiatric examination, requested by authority  Assessment and Plan of Treatment: You were seen by psychiatrist, as per their evaluation you do not have capacity to care for yourself  safely at home, and does not have capacity to determine your own disposition, and next of kin (sister) should be approached for any necessary consents.  You were started on mirtazipine as per their recommendation to help with mood and sleep.    Diagnosis: JOANN (acute kidney injury)  Assessment and Plan of Treatment: Your creatinine was elevated on admission likely secondary to rhabdomyolysis which is muscle break down seen after falls. Your kidney functions improved after IV fluids were administered    Diagnosis: Diabetes mellitus  Assessment and Plan of Treatment: Continue with your blood sugar medication. HbAIC was found in admission on 8.9.  You must maintain a healthy diet that consist of low sugar, low fat, low sodium diet. Exercise frequently if possible. Consider repeating your Hemoglobin A1c within 3 months after discharge to monitor your average blood glucose control. Follow up with primary care physician in one week after discharge.      Diagnosis: Acute pain of right shoulder  Assessment and Plan of Treatment: you had complaints of right shoulder pain and had limited range of motion in that shoulder raising concerning for shoulder dislocation, we continued you on pain control as needed post fall. right shoulder/ elbow x rays showed no fracture. You were seen by orthopedics and they recommended to have pain control and daily physical therapy - WBAT to right shoulder wwas placed. You to Follow-up with Dr. Seldes in ONE WEEK at [phone] PRINCIPAL DISCHARGE DIAGNOSIS  Diagnosis: Syncope  Assessment and Plan of Treatment: You came in after an episode of  unwitnessed fall  You were admitted to exclude any neurological or cardiological causes.   ECHO test was normal.   Your CT head was negative for any bleed.   Initially placed on telemetry to monitor for ny arrhythmias but it did not show any event and was discontinued   EEG of the brain was concerning for seizures.   Episode of fall likely due to seizure activity given focal spike on EEG, neurologist recommended that you should be started on anticonvulsant Keppra 500mg twice a day   MRI of your head was also performed which showedXXX   Seizure Precautions reviewed with you: Avoid activities that may cause self-harm if there is sudden loss of consciousness for at least 6 months, including: Operating heavy machinery, Standing at heights or near open flames, Bathing or Swimming alone  Please see your outpatient doctor to resume medications as necessary after completion of rehab.        SECONDARY DISCHARGE DIAGNOSES  Diagnosis: Encounter for general psychiatric examination, requested by authority  Assessment and Plan of Treatment: You were seen by psychiatrist, as per their evaluation you do not have capacity to care for yourself  safely at home, and does not have capacity to determine your own disposition, and next of kin (sister) should be approached for any necessary consents.  You were started on mirtazipine as per their recommendation to help with mood and sleep.    Diagnosis: JOANN (acute kidney injury)  Assessment and Plan of Treatment: Your creatinine was elevated on admission likely secondary to rhabdomyolysis which is muscle break down seen after falls. Your kidney functions improved after IV fluids were administered    Diagnosis: Diabetes mellitus  Assessment and Plan of Treatment: Continue with your blood sugar medication. HbAIC was found in admission on 8.9. You were seen by endocrinologist and will be getting humalog 6 Units three times a day with lantus 8 units at bedtime daily. You must maintain a healthy diet that consist of low sugar, low fat, low sodium diet. Exercise frequently if possible. Consider repeating your Hemoglobin A1c within 3 months after discharge to monitor your average blood glucose control. Follow up with primary care physician in one week after discharge.      Diagnosis: Acute pain of right shoulder  Assessment and Plan of Treatment: you had complaints of right shoulder pain and had limited range of motion in that shoulder raising concerning for shoulder dislocation, we continued you on pain control as needed post fall. right shoulder/ elbow x rays showed no fracture. You were seen by orthopedics and they recommended to have pain control and daily physical therapy - WBAT to right shoulder was placed. You to Follow-up with Dr. Avila in ONE WEEK at [phone] PRINCIPAL DISCHARGE DIAGNOSIS  Diagnosis: Syncope  Assessment and Plan of Treatment: You came in after an episode of  unwitnessed fall  You were admitted to exclude any neurological or cardiological causes.   ECHO test was normal.   Your CT head was negative for any bleed.   Initially placed on telemetry to monitor for ny arrhythmias but it did not show any event and was discontinued   EEG of the brain was concerning for seizures.   Episode of fall likely due to seizure activity given focal spike on EEG, neurologist recommended that you should be started on anticonvulsant Keppra 500mg twice a day   MRI of your head was also performed which showed chronic microvascular change and aspirin, plavix and statin were started upon discharge  Seizure Precautions reviewed with you: Avoid activities that may cause self-harm if there is sudden loss of consciousness for at least 6 months, including: Operating heavy machinery, Standing at heights or near open flames, Bathing or Swimming alone  Please see your outpatient doctor to resume medications as necessary after completion of rehab.        SECONDARY DISCHARGE DIAGNOSES  Diagnosis: Encounter for general psychiatric examination, requested by authority  Assessment and Plan of Treatment: You were seen by psychiatrist, as per their evaluation you do not have capacity to care for yourself  safely at home, and does not have capacity to determine your own disposition, and next of kin (sister) should be approached for any necessary consents.  You were started on mirtazipine as per their recommendation to help with mood and sleep.    Diagnosis: JOANN (acute kidney injury)  Assessment and Plan of Treatment: Your creatinine was elevated on admission likely secondary to rhabdomyolysis which is muscle break down seen after falls. Your kidney functions improved after IV fluids were administered    Diagnosis: Diabetes mellitus  Assessment and Plan of Treatment: Continue with your blood sugar medication. HbAIC was found in admission on 8.9. You were seen by endocrinologist and will be getting humalog 6 Units three times a day with lantus 8 units at bedtime daily. You must maintain a healthy diet that consist of low sugar, low fat, low sodium diet. Exercise frequently if possible. Consider repeating your Hemoglobin A1c within 3 months after discharge to monitor your average blood glucose control. Follow up with primary care physician in one week after discharge.      Diagnosis: Acute pain of right shoulder  Assessment and Plan of Treatment: you had complaints of right shoulder pain and had limited range of motion in that shoulder raising concerning for shoulder dislocation, we continued you on pain control as needed post fall. right shoulder/ elbow x rays showed no fracture. You were seen by orthopedics and they recommended to have pain control and daily physical therapy - WBAT to right shoulder was placed. You to Follow-up with Dr. Avila in ONE WEEK at [phone]

## 2020-02-28 NOTE — BEHAVIORAL HEALTH ASSESSMENT NOTE - NSBHCHARTREVIEWIMAGING_PSY_A_CORE FT
< from: CT Head No Cont (02.25.20 @ 16:46) >    There is no evidence of acute infarction, intracranial hemorrhage or mass lesion.  There is hypoattenuation of the subcortical and periventricular white matter, which is nonspecific finding, but most likely represents sequela of chronic microvascular ischemic disease. There are atherosclerotic calcifications of the cavernous and supraclinoid internal carotid arteries bilaterally. There is prominence of the cortical sulci related to underlying brain parenchymal volume loss.    There is no evidence of hydrocephalus. There are no extra-axial fluid collections.    The patient is status post bilateral lens replacement. Visualized intraorbital contents are otherwise unremarkable. The imaged portions of the paranasal sinuses are aerated. The mastoid air cells are clear. The visualized soft tissues and osseous structures appear unremarkable. Please note, streak artifact related to dental hardware partially limits assessment of the posterior fossa.    < end of copied text >

## 2020-02-28 NOTE — PHYSICAL THERAPY INITIAL EVALUATION ADULT - ACTIVE RANGE OF MOTION EXAMINATION, REHAB EVAL
Right shoulder fle/abd 50. Right elbow/wrist- WFL/Left LE Active ROM was WFL (within functional limits)/bilateral  lower extremity Active ROM was WFL (within functional limits)

## 2020-02-28 NOTE — BEHAVIORAL HEALTH ASSESSMENT NOTE - HPI (INCLUDE ILLNESS QUALITY, SEVERITY, DURATION, TIMING, CONTEXT, MODIFYING FACTORS, ASSOCIATED SIGNS AND SYMPTOMS)
84F,  and living alone in [place], with unknown PHx and MHx of colon cancer s/p colectomy, DM and OA, BIBEMS activated by visiting OT who found pt on floor s/p unwitnessed fall. Pt admitted for w/u of recurrent near-syncopal episodes. Psych consult was requested to evaluate capacity to participate in dispo planning, after pt reported opposition to [facility] followed by assisted living in [place] as recommended by pt's family, and also for assessment of suspected hoarding behaviors reported by family. On exam, pt presents pleasant and cooperative but proves to be a poor historian, offering a confused narrative of someone falling on her at [facility] after cleaning up a coffee spill with paper towels. When asked about clutter in her apartment, pt reports that that for the past 15-20 yrs (soon after the sudden death of her  in ) she has been accumulating financial papers in bags which she has stuffed into her closets. Pt also reports that she has purchased a lot of clothing, books and baking utensils that she does not need. Pt says she realizes that she has to clear out the unneeded items, but feels she can do so herself, without professional help. Pt bridles when MD suggests that perhaps the job is too big for pt to undertake on her own, and refuses to reason with MD about possible alternative strategies to get the apartment clean ("I just need a week by myself, and the whole thing will be taken care of"). Pt denies any health or safety hazards from clutter in the apartment. When MD mentions that pt's relatives have suggested she move to assisted living in [place] to be near remaining family (sister, LEON, nieces and nephews), pt reacts with alarm: "I'd rather die than go to [place]!" Pt describes mood as "not being listened to," adding, "I was depressed for years because everyone I was close to , but no one did anything to help." Pt says that she feels better "since they stopped giving me all those meds I used to be on" while at [facility]. Pt denies feeling especially depressed now, but does endorse frequent insomnia recently. When MD suggests a trial of low-dose mirtazapine to address both sleep and mood, pt reacts ambivalently ("Is this going to change my personality?").

## 2020-02-28 NOTE — BEHAVIORAL HEALTH ASSESSMENT NOTE - VIOLENCE PROTECTIVE FACTORS:
August 28, 2018    Charis Kruger  [address] [address]    Dear Charis,       We recently received a refill request for FLUoxetine (PROZAC) 20 MG capsule.  We have refilled this for a one time 30 day supply only because you are due for a:    Anxiety and depression office visit      Please call at your earliest convenience so that there will not be a delay with your future refills.          Thank you,   Your [facility] Team/  [phone]                
Relationship stability/Residential stability/Affective Stability

## 2020-02-28 NOTE — BEHAVIORAL HEALTH ASSESSMENT NOTE - SUMMARY
84F,  and living alone in [place], with unknown PHx and MHx of colon cancer s/p colectomy, DM and OA, BIBEMS activated by visiting OT who found pt on floor s/p unwitnessed fall. Pt admitted for w/u of recurrent near-syncopal episodes. Psych consult was requested to evaluate capacity to participate in dispo planning, after pt reported opposition to [facility] followed by assisted living in [place] as recommended by pt's family, and also for assessment of suspected hoarding behaviors reported by family. On exam, pt presents pleasant and mostly cooperative without obvious s/s of dementia or psychosis, but she demonstrates a striking lack of insight and judgment about the health and safety hazards of her hoarding behaviors and insists that she can clean her apartment (which is likely extremely cluttered) alone, without any assistance. Accordingly, we find that pt does NOT have capacity to care for herself safely at home, and does not have capacity to determine her own disposition, and next of kin (sister) should be approached for any necessary consents. Despite pt's reluctance, it may well be best for pt to transition to assisted living in [place] where she can be close to her family. However, pt does not appear to present an acute risk of harm to self or others at the time of assessment, and does not appear to be in need of admission to  psych at the time of assessment.

## 2020-02-29 LAB
ANION GAP SERPL CALC-SCNC: 5 MMOL/L — SIGNIFICANT CHANGE UP (ref 5–17)
BUN SERPL-MCNC: 23 MG/DL — HIGH (ref 7–18)
CALCIUM SERPL-MCNC: 8.7 MG/DL — SIGNIFICANT CHANGE UP (ref 8.4–10.5)
CHLORIDE SERPL-SCNC: 109 MMOL/L — HIGH (ref 96–108)
CO2 SERPL-SCNC: 29 MMOL/L — SIGNIFICANT CHANGE UP (ref 22–31)
CREAT SERPL-MCNC: 0.83 MG/DL — SIGNIFICANT CHANGE UP (ref 0.5–1.3)
GLUCOSE BLDC GLUCOMTR-MCNC: 311 MG/DL — HIGH (ref 70–99)
GLUCOSE BLDC GLUCOMTR-MCNC: 340 MG/DL — HIGH (ref 70–99)
GLUCOSE BLDC GLUCOMTR-MCNC: 392 MG/DL — HIGH (ref 70–99)
GLUCOSE BLDC GLUCOMTR-MCNC: 98 MG/DL — SIGNIFICANT CHANGE UP (ref 70–99)
GLUCOSE SERPL-MCNC: 148 MG/DL — HIGH (ref 70–99)
HCT VFR BLD CALC: 37.9 % — SIGNIFICANT CHANGE UP (ref 34.5–45)
HGB BLD-MCNC: 11.9 G/DL — SIGNIFICANT CHANGE UP (ref 11.5–15.5)
MCHC RBC-ENTMCNC: 29 PG — SIGNIFICANT CHANGE UP (ref 27–34)
MCHC RBC-ENTMCNC: 31.4 GM/DL — LOW (ref 32–36)
MCV RBC AUTO: 92.2 FL — SIGNIFICANT CHANGE UP (ref 80–100)
NRBC # BLD: 0 /100 WBCS — SIGNIFICANT CHANGE UP (ref 0–0)
PLATELET # BLD AUTO: 170 K/UL — SIGNIFICANT CHANGE UP (ref 150–400)
POTASSIUM SERPL-MCNC: 3.6 MMOL/L — SIGNIFICANT CHANGE UP (ref 3.5–5.3)
POTASSIUM SERPL-SCNC: 3.6 MMOL/L — SIGNIFICANT CHANGE UP (ref 3.5–5.3)
RBC # BLD: 4.11 M/UL — SIGNIFICANT CHANGE UP (ref 3.8–5.2)
RBC # FLD: 14.5 % — SIGNIFICANT CHANGE UP (ref 10.3–14.5)
SODIUM SERPL-SCNC: 143 MMOL/L — SIGNIFICANT CHANGE UP (ref 135–145)
WBC # BLD: 5.06 K/UL — SIGNIFICANT CHANGE UP (ref 3.8–10.5)
WBC # FLD AUTO: 5.06 K/UL — SIGNIFICANT CHANGE UP (ref 3.8–10.5)

## 2020-02-29 PROCEDURE — 99232 SBSQ HOSP IP/OBS MODERATE 35: CPT

## 2020-02-29 PROCEDURE — 95819 EEG AWAKE AND ASLEEP: CPT | Mod: 26

## 2020-02-29 RX ADMIN — HEPARIN SODIUM 5000 UNIT(S): 5000 INJECTION INTRAVENOUS; SUBCUTANEOUS at 06:01

## 2020-02-29 RX ADMIN — Medication 4: at 11:49

## 2020-02-29 RX ADMIN — INSULIN GLARGINE 8 UNIT(S): 100 INJECTION, SOLUTION SUBCUTANEOUS at 21:32

## 2020-02-29 RX ADMIN — HEPARIN SODIUM 5000 UNIT(S): 5000 INJECTION INTRAVENOUS; SUBCUTANEOUS at 21:33

## 2020-02-29 RX ADMIN — ATORVASTATIN CALCIUM 20 MILLIGRAM(S): 80 TABLET, FILM COATED ORAL at 21:33

## 2020-02-29 RX ADMIN — HEPARIN SODIUM 5000 UNIT(S): 5000 INJECTION INTRAVENOUS; SUBCUTANEOUS at 14:00

## 2020-02-29 RX ADMIN — Medication 4: at 17:09

## 2020-02-29 NOTE — PROGRESS NOTE ADULT - SUBJECTIVE AND OBJECTIVE BOX
Patient is a 84y old  Female who presents with a chief complaint of syncope (28 Feb 2020 14:34)    HPI:  Patient is an 84 year old female, with PMH of colon cancer s/p colectomy, DM and OA, who comes in due to unwitnessed fall found on floor by occupational therapist. Patient is a poor historian. Patient states that someone slipped on coffee and bumped into her causing her to fall. Patient states that she was in [facility] but patient was discharged recently from [facility] as per ED note. Patient denies any loss of consciousness or head trauma. Patient states she has some pain in her arm and knees but states that it gets better when she walks around. Denies any chest pain, nausea, vomiting, abdominal pain. (25 Feb 2020 18:54)    Today: Pt notes pain in her right shoulder region. Pt also anxious as she needs to contact her landlord but states that phone number has changed.   No sob, no fevers, no weakness.     PAST MEDICAL & SURGICAL HISTORY:  Colon cancer  Osteoarthritis  Diabetes mellitus  Status post cataract extraction: right eye done in September 2017  History of colonoscopy  Status post colectomy    MEDICATIONS  (STANDING):  aspirin enteric coated 81 milliGRAM(s) Oral daily  atorvastatin 20 milliGRAM(s) Oral at bedtime  heparin  Injectable 5000 Unit(s) SubCutaneous every 8 hours  insulin glargine Injectable (LANTUS) 8 Unit(s) SubCutaneous at bedtime  insulin lispro (HumaLOG) corrective regimen sliding scale   SubCutaneous three times a day before meals  lactated ringers. 1000 milliLiter(s) (100 mL/Hr) IV Continuous <Continuous>    MEDICATIONS  (PRN):  acetaminophen   Tablet .. 650 milliGRAM(s) Oral every 6 hours PRN Mild Pain (1 - 3)    EXAM:  Vital Signs Last 24 Hrs  T(C): 37 (29 Feb 2020 23:42), Max: 37 (29 Feb 2020 23:42)  T(F): 98.6 (29 Feb 2020 23:42), Max: 98.6 (29 Feb 2020 23:42)  HR: 76 (29 Feb 2020 23:42) (70 - 76)  BP: 158/65 (29 Feb 2020 23:42) (152/65 - 165/68)  BP(mean): --  RR: 18 (29 Feb 2020 23:42) (17 - 18)  SpO2: 98% (29 Feb 2020 23:42) (98% - 99%)    PHYSICAL EXAM:  Constitutional: awake, nad, elderly F  Neck: supple.   Gastrointestinal: soft, non tender, no rebound, no guarding.   Extremities: no edema.   Neurological: alert   Skin: intact no rash, warm to touch.   Psychiatric: appropriate affect. Anxious.     LABS:                      11.9   5.06  )-----------( 170      ( 29 Feb 2020 07:16 )             37.9     02-29    143  |  109<H>  |  23<H>  ----------------------------<  148<H>  3.6   |  29  |  0.83    Ca    8.7      29 Feb 2020 07:16

## 2020-02-29 NOTE — PROGRESS NOTE ADULT - ASSESSMENT
84 year old female, with PMH of colon cancer s/p colectomy, DM and OA, who comes in due to unwitnessed fall found on floor by occupational therapist. Found to have rhabdomyolysis.      Problem/Plan - 1:  ·  Problem: Unwitnessed fall.  Plan: Patient had unwitnessed fall, most likely mechanical from OA  she is c/o pain in R elbow and shoulder  She gives h/o multiple near syncopal episodes  echo was done which was normal  f/u xray of R shoulder and elbow  PT eval pending.      Problem/Plan - 2:  ·  Problem: Rhabdomyolysis.    Resolved with IVF's.      Problem/Plan - 3:  ·  Problem: JOANN (acute kidney injury).  Plan: Patient had elevated cr due to rhabdo  resolved now s/p fluids.      Problem/Plan - 4:  ·  Problem: Diabetes mellitus.  Plan: history of DM   continue lantus 8 units at bedtime and HSS  monitor blood sugars.      Problem/Plan - 5:  ·  Problem: Dementia and Hoarding disorder with poor insight.    Dementia---Behavioral health assessment: "pt does NOT have capacity to care for herself safely at home, and does not have capacity to determine her own disposition, and next of kin (sister) should be approached for any necessary consents." Family wants her to be placed in assisted living.     Problem/Plan - 6:  Problem: Social problem. Plan: Family wants her to be placed in assisted living in [place].     Problem/Plan - 7:  ·  Problem: Osteoarthritis.  Plan: c/w pain control as needed post fall  -Pending Rt shoulder/ elbow x rays.      Problem/Plan - 8:  ·  Problem: Need for prophylactic measure.  Plan: heparin for DVT prophylaxis.

## 2020-02-29 NOTE — EEG REPORT - NS EEG TEXT BOX
[facility]  Report of Routine EEG Study with Video      [facility]: [address], [address] [place] [address], Phone: [phone]  [facility]: [address] [place] [address], Phone: [phone]  Office: [address] [place] [address], [place] [address], [place] [address], Phone: [phone]    Patient Name: DAYSI WILLETT    Age: 84 year  : 1935  Patient ID: -, MRN #: -, Location: -  Referring Physician: DR WALTERS  EEG #: [id number]    Study Date: 2020		    Technical Information:					  On Instrument: -  Placement and Labeling of Electrodes:  The EEG was performed utilizing 20 channels referential EEG connections (coronal over temporal over parasagittal montage) using all standard 10-20 electrode placements with EKG.  Recording was at a sampling rate of 256 samples per second per channel.  Time synchronized digital video recording was done simultaneously with EEG recording.  A low light infrared camera was used for low light recording.  Riley and seizure detection algorithms were utilized.    History:  ROUTINE EEG PERFORMED AT BEDSIDE  83 Y/O FEMALE   FATIMAH : ALERT AND ORIENTED X 3  H / O : COLON CANCER, DM AND OA  P/W : UNWITHNESSED FALL, SYNCOPE    Medication	  No Data.	    [Abbreviation Key:  PDR=alpha rhythm/posterior dominant rhythm. A-P=anterior posterior gradient.  Amplitude: ‘very low’:<20; ‘low’:20-50; ‘medium’:; ‘high’:>200uV.  Persistence for periodic/rhythmic patterns (% of epoch) ‘rare’:<1%; ‘occasional’:1-10%; ‘frequent’:10-50%; ‘abundant’:50-90%; ‘continuous’:>90%.  Persistence for sporadic discharges: ‘rare’:<1/hr; ‘occasional’:1/min-1/hr; ‘frequent’:>1/min; ‘abundant’:>1/10 sec.  GRDA=generalized rhythmic delta activity, LRDA=lateralized rhythmic delta activity, TIRDA=temporal intermittent rhythmic delta activity, FIRDA=frontal intermittent rhythmic activity. LPD=PLED=lateralized periodic discharges, GPD=generalized periodic discharges, BiPDs=BiPLEDs=bilateral independent periodic epileptiform discharges, SIRPID=stimulus induced rhythmic, periodic, or ictal appearing discharges.  Modifiers: +F=with fast component, +S=with spike component, +R=with rhythmic component.  S-B=burst suppression pattern. PFA: paroxysmal fast activity. Max=maximal. N1-drowsy, N2-stage II sleep, N3-slow wave sleep.  HV=hyperventilation, PS=photic stimulation]    Study Interpretation:    FINDINGS:      Background:   -The background was continuous, spontaneously variable and reactive. During wakefulness, the posterior dominant rhythm consisted of symmetric, well-modulated 9-10 Hz activity, with amplitude to 30 uV, that attenuated to eye opening. Low amplitude frontal beta was noted in wakefulness.      Background Slowing:  -No generalized background slowing was present.    Focal Slowing:   There is continuous polymorphic delta slowing in the left temporal and frontal regions.     Sleep Background:  -Drowsiness and stage II sleep were not recorded.    Other Non-Epileptiform Findings:  -None were present.    Interictal Epileptiform Activity:   There are occasional spikes in the left temporal region.    Events:  -Clinical events: None recorded.  -Seizures: None recorded.    Activation Procedures:   -Hyperventilation was not performed.    -Photic stimulation was not performed.    Artifacts:  -Intermittent myogenic and movement artifacts were noted.    ECG:  -The heart rate on single channel ECG was predominantly between 60-70 BPM.    EEG Summary/Classification:  Abnormal EEG: awake   1.	Spikes, focal, left temporal  2.	continuous polymorphic slowing, focal, left temporal and frontal region    EEG Impression/Clinical Correlate:  There is evidence of potentially epileptogenic structural abnormality in the left temporal region.  ________________________________________    		      Rocky Johnson MD PhD  Director, Epilepsy Division, Replaced by [facility]

## 2020-03-01 LAB
GLUCOSE BLDC GLUCOMTR-MCNC: 134 MG/DL — HIGH (ref 70–99)
GLUCOSE BLDC GLUCOMTR-MCNC: 276 MG/DL — HIGH (ref 70–99)
GLUCOSE BLDC GLUCOMTR-MCNC: 321 MG/DL — HIGH (ref 70–99)
GLUCOSE BLDC GLUCOMTR-MCNC: 420 MG/DL — HIGH (ref 70–99)
GLUCOSE BLDC GLUCOMTR-MCNC: 432 MG/DL — HIGH (ref 70–99)

## 2020-03-01 PROCEDURE — 99232 SBSQ HOSP IP/OBS MODERATE 35: CPT

## 2020-03-01 RX ORDER — INSULIN LISPRO 100/ML
3 VIAL (ML) SUBCUTANEOUS
Refills: 0 | Status: DISCONTINUED | OUTPATIENT
Start: 2020-03-01 | End: 2020-03-03

## 2020-03-01 RX ORDER — MIRTAZAPINE 45 MG/1
7.5 TABLET, ORALLY DISINTEGRATING ORAL AT BEDTIME
Refills: 0 | Status: DISCONTINUED | OUTPATIENT
Start: 2020-03-01 | End: 2020-03-03

## 2020-03-01 RX ORDER — SENNA PLUS 8.6 MG/1
2 TABLET ORAL AT BEDTIME
Refills: 0 | Status: DISCONTINUED | OUTPATIENT
Start: 2020-03-01 | End: 2020-03-03

## 2020-03-01 RX ORDER — POLYETHYLENE GLYCOL 3350 17 G/17G
17 POWDER, FOR SOLUTION ORAL DAILY
Refills: 0 | Status: DISCONTINUED | OUTPATIENT
Start: 2020-03-01 | End: 2020-03-03

## 2020-03-01 RX ORDER — GLUCAGON INJECTION, SOLUTION 0.5 MG/.1ML
1 INJECTION, SOLUTION SUBCUTANEOUS ONCE
Refills: 0 | Status: DISCONTINUED | OUTPATIENT
Start: 2020-03-01 | End: 2020-03-03

## 2020-03-01 RX ORDER — SODIUM CHLORIDE 9 MG/ML
1000 INJECTION, SOLUTION INTRAVENOUS
Refills: 0 | Status: DISCONTINUED | OUTPATIENT
Start: 2020-03-01 | End: 2020-03-03

## 2020-03-01 RX ORDER — DEXTROSE 50 % IN WATER 50 %
15 SYRINGE (ML) INTRAVENOUS ONCE
Refills: 0 | Status: DISCONTINUED | OUTPATIENT
Start: 2020-03-01 | End: 2020-03-03

## 2020-03-01 RX ORDER — INSULIN LISPRO 100/ML
VIAL (ML) SUBCUTANEOUS
Refills: 0 | Status: DISCONTINUED | OUTPATIENT
Start: 2020-03-01 | End: 2020-03-03

## 2020-03-01 RX ORDER — DEXTROSE 50 % IN WATER 50 %
25 SYRINGE (ML) INTRAVENOUS ONCE
Refills: 0 | Status: DISCONTINUED | OUTPATIENT
Start: 2020-03-01 | End: 2020-03-03

## 2020-03-01 RX ORDER — DEXTROSE 50 % IN WATER 50 %
12.5 SYRINGE (ML) INTRAVENOUS ONCE
Refills: 0 | Status: DISCONTINUED | OUTPATIENT
Start: 2020-03-01 | End: 2020-03-03

## 2020-03-01 RX ADMIN — Medication 6: at 22:03

## 2020-03-01 RX ADMIN — HEPARIN SODIUM 5000 UNIT(S): 5000 INJECTION INTRAVENOUS; SUBCUTANEOUS at 15:28

## 2020-03-01 RX ADMIN — HEPARIN SODIUM 5000 UNIT(S): 5000 INJECTION INTRAVENOUS; SUBCUTANEOUS at 06:05

## 2020-03-01 RX ADMIN — MIRTAZAPINE 7.5 MILLIGRAM(S): 45 TABLET, ORALLY DISINTEGRATING ORAL at 22:03

## 2020-03-01 RX ADMIN — HEPARIN SODIUM 5000 UNIT(S): 5000 INJECTION INTRAVENOUS; SUBCUTANEOUS at 22:04

## 2020-03-01 RX ADMIN — INSULIN GLARGINE 8 UNIT(S): 100 INJECTION, SOLUTION SUBCUTANEOUS at 22:03

## 2020-03-01 RX ADMIN — POLYETHYLENE GLYCOL 3350 17 GRAM(S): 17 POWDER, FOR SOLUTION ORAL at 13:58

## 2020-03-01 RX ADMIN — Medication 4: at 17:47

## 2020-03-01 RX ADMIN — Medication 81 MILLIGRAM(S): at 12:06

## 2020-03-01 RX ADMIN — SENNA PLUS 2 TABLET(S): 8.6 TABLET ORAL at 22:59

## 2020-03-01 RX ADMIN — Medication 3: at 12:06

## 2020-03-01 RX ADMIN — ATORVASTATIN CALCIUM 20 MILLIGRAM(S): 80 TABLET, FILM COATED ORAL at 22:04

## 2020-03-01 NOTE — PROGRESS NOTE ADULT - PROBLEM SELECTOR PLAN 5
Psych consulted for hoarding issue and capacity assessment Psych consulted for hoarding issue and capacity assessment  Hoarding disorder, Adjustment disorder with anxiety  As per psych eval: pt does NOT have capacity to care for herself safely at home, and does not have capacity to determine her own disposition, and next of kin (sister) should be approached for any necessary consents.  will start on mirtazipine as per psych recc to help with mood and sleep

## 2020-03-01 NOTE — PROGRESS NOTE ADULT - PROBLEM SELECTOR PLAN 6
Family wants her to be placed in assisted living in [place] with private payments   working with family Family wants her to be placed in assisted living in [place] with private payments  PT recc JOYCE    working with family

## 2020-03-01 NOTE — PROGRESS NOTE ADULT - PROBLEM SELECTOR PLAN 1
Patient had unwitnessed fall, most likely mechanical from OA  she is c/o pain in R elbow and shoulder  She gives h/o multiple near syncopal episodes  echo was done which was normal  tele did not show any event  tele was d/c   f/u xray of R shoulder and elbow  PT eval pending Patient had unwitnessed fall, most likely mechanical from OA  she is c/o pain in R elbow and shoulder  She gives h/o multiple near syncopal episodes  echo was done which was normal  tele did not show any event  tele was d/c   f/u xray of R shoulder and elbow  EEG shows Spikes, focal, left temporal  continuous polymorphic slowing, focal, left temporal and frontal region  Pt recc JOYCE

## 2020-03-01 NOTE — PROGRESS NOTE ADULT - ASSESSMENT
Patient is an 84 year old female, with PMH of colon cancer s/p colectomy, DM and OA, who comes in due to unwitnessed fall found on floor by occupational therapist.      Pending Xray for PT   pending psych consult for capacity assessment and hoarding issue  Family wants her to be placed in assisted living , PT recc JOYCE

## 2020-03-01 NOTE — PROGRESS NOTE ADULT - PROBLEM SELECTOR PLAN 7
c/w pain control as needed post fall  -Recommend attaining Rt shoulder/ elbow x rays as opt is having difficulty moving RUE. c/w pain control as needed post fall   PENDING Rt shoulder/ elbow x rays as opt is having difficulty moving RUE.

## 2020-03-01 NOTE — PROGRESS NOTE ADULT - SUBJECTIVE AND OBJECTIVE BOX
PGY 1 Note discussed with supervising resident and primary attending    Patient is a 84y old  Female who presents with a chief complaint of syncope (29 Feb 2020 03:02)      INTERVAL HPI/OVERNIGHT EVENTS: continues to have shoulder pain, concerned about going to rehab     MEDICATIONS  (STANDING):  aspirin enteric coated 81 milliGRAM(s) Oral daily  atorvastatin 20 milliGRAM(s) Oral at bedtime  heparin  Injectable 5000 Unit(s) SubCutaneous every 8 hours  insulin glargine Injectable (LANTUS) 8 Unit(s) SubCutaneous at bedtime  insulin lispro (HumaLOG) corrective regimen sliding scale   SubCutaneous three times a day before meals  lactated ringers. 1000 milliLiter(s) (100 mL/Hr) IV Continuous <Continuous>    MEDICATIONS  (PRN):  acetaminophen   Tablet .. 650 milliGRAM(s) Oral every 6 hours PRN Mild Pain (1 - 3)      __________________________________________________  REVIEW OF SYSTEMS:    CONSTITUTIONAL: No fever,   EYES: no acute visual disturbances  NECK: No pain or stiffness  RESPIRATORY: No cough; No shortness of breath  CARDIOVASCULAR: No chest pain, no palpitations  GASTROINTESTINAL: No pain. No nausea or vomiting; No diarrhea   NEUROLOGICAL: No headache or numbness, no tremors  MUSCULOSKELETAL: No joint pain, no muscle pain  GENITOURINARY: no dysuria, no frequency, no hesitancy  PSYCHIATRY: no depression , no anxiety  ALL OTHER  ROS negative        Vital Signs Last 24 Hrs  T(C): 36.4 (01 Mar 2020 08:37), Max: 37 (29 Feb 2020 23:42)  T(F): 97.6 (01 Mar 2020 08:37), Max: 98.6 (29 Feb 2020 23:42)  HR: 68 (01 Mar 2020 08:37) (68 - 76)  BP: 161/65 (01 Mar 2020 08:37) (152/65 - 161/65)  BP(mean): --  RR: 17 (01 Mar 2020 08:37) (17 - 18)  SpO2: 99% (01 Mar 2020 08:37) (98% - 99%)    ________________________________________________  PHYSICAL EXAM:  GENERAL: NAD  HEENT: Normocephalic;  conjunctivae and sclerae clear; moist mucous membranes;   NECK : supple  CHEST/LUNG: Clear to auscultation bilaterally with good air entry   HEART: S1 S2  regular; no murmurs, gallops or rubs  ABDOMEN: lumpy, Nontender, mildly distended; no bowel movement in days, Bowel sounds present  EXTREMITIES: no cyanosis; no edema; no calf tenderness  SKIN: warm and dry; bruising noted on the left forearm, R forearm with mild edema   NERVOUS SYSTEM:  Awake and alert; Oriented  to person; mildly anxious   _________________________________________________  LABS:                        11.9   5.06  )-----------( 170      ( 29 Feb 2020 07:16 )             37.9     02-29    143  |  109<H>  |  23<H>  ----------------------------<  148<H>  3.6   |  29  |  0.83    Ca    8.7      29 Feb 2020 07:16          CAPILLARY BLOOD GLUCOSE      POCT Blood Glucose.: 134 mg/dL (01 Mar 2020 08:27)  POCT Blood Glucose.: 392 mg/dL (29 Feb 2020 20:59)  POCT Blood Glucose.: 311 mg/dL (29 Feb 2020 16:54)  POCT Blood Glucose.: 340 mg/dL (29 Feb 2020 11:45)        RADIOLOGY & ADDITIONAL TESTS:    Imaging Personally Reviewed:  YES/NO    Consultant(s) Notes Reviewed:   YES/ No    Care Discussed with Consultants :     Plan of care was discussed with patient and /or primary care giver; all questions and concerns were addressed and care was aligned with patient's wishes.

## 2020-03-02 DIAGNOSIS — M25.511 PAIN IN RIGHT SHOULDER: ICD-10-CM

## 2020-03-02 DIAGNOSIS — R56.9 UNSPECIFIED CONVULSIONS: ICD-10-CM

## 2020-03-02 LAB
ALBUMIN SERPL ELPH-MCNC: 2.2 G/DL — LOW (ref 3.5–5)
ALP SERPL-CCNC: 107 U/L — SIGNIFICANT CHANGE UP (ref 40–120)
ALT FLD-CCNC: 35 U/L DA — SIGNIFICANT CHANGE UP (ref 10–60)
ANION GAP SERPL CALC-SCNC: 2 MMOL/L — LOW (ref 5–17)
AST SERPL-CCNC: 30 U/L — SIGNIFICANT CHANGE UP (ref 10–40)
BILIRUB SERPL-MCNC: 0.3 MG/DL — SIGNIFICANT CHANGE UP (ref 0.2–1.2)
BUN SERPL-MCNC: 23 MG/DL — HIGH (ref 7–18)
CALCIUM SERPL-MCNC: 8.9 MG/DL — SIGNIFICANT CHANGE UP (ref 8.4–10.5)
CHLORIDE SERPL-SCNC: 108 MMOL/L — SIGNIFICANT CHANGE UP (ref 96–108)
CO2 SERPL-SCNC: 32 MMOL/L — HIGH (ref 22–31)
CREAT SERPL-MCNC: 0.77 MG/DL — SIGNIFICANT CHANGE UP (ref 0.5–1.3)
CULTURE RESULTS: SIGNIFICANT CHANGE UP
CULTURE RESULTS: SIGNIFICANT CHANGE UP
GLUCOSE BLDC GLUCOMTR-MCNC: 169 MG/DL — HIGH (ref 70–99)
GLUCOSE BLDC GLUCOMTR-MCNC: 244 MG/DL — HIGH (ref 70–99)
GLUCOSE BLDC GLUCOMTR-MCNC: 245 MG/DL — HIGH (ref 70–99)
GLUCOSE BLDC GLUCOMTR-MCNC: 316 MG/DL — HIGH (ref 70–99)
GLUCOSE BLDC GLUCOMTR-MCNC: 95 MG/DL — SIGNIFICANT CHANGE UP (ref 70–99)
GLUCOSE SERPL-MCNC: 107 MG/DL — HIGH (ref 70–99)
HCT VFR BLD CALC: 37.4 % — SIGNIFICANT CHANGE UP (ref 34.5–45)
HGB BLD-MCNC: 11.9 G/DL — SIGNIFICANT CHANGE UP (ref 11.5–15.5)
MCHC RBC-ENTMCNC: 29.4 PG — SIGNIFICANT CHANGE UP (ref 27–34)
MCHC RBC-ENTMCNC: 31.8 GM/DL — LOW (ref 32–36)
MCV RBC AUTO: 92.3 FL — SIGNIFICANT CHANGE UP (ref 80–100)
NRBC # BLD: 0 /100 WBCS — SIGNIFICANT CHANGE UP (ref 0–0)
PLATELET # BLD AUTO: 163 K/UL — SIGNIFICANT CHANGE UP (ref 150–400)
POTASSIUM SERPL-MCNC: 3.9 MMOL/L — SIGNIFICANT CHANGE UP (ref 3.5–5.3)
POTASSIUM SERPL-SCNC: 3.9 MMOL/L — SIGNIFICANT CHANGE UP (ref 3.5–5.3)
PROT SERPL-MCNC: 5.6 G/DL — LOW (ref 6–8.3)
RBC # BLD: 4.05 M/UL — SIGNIFICANT CHANGE UP (ref 3.8–5.2)
RBC # FLD: 14.3 % — SIGNIFICANT CHANGE UP (ref 10.3–14.5)
SODIUM SERPL-SCNC: 142 MMOL/L — SIGNIFICANT CHANGE UP (ref 135–145)
SPECIMEN SOURCE: SIGNIFICANT CHANGE UP
SPECIMEN SOURCE: SIGNIFICANT CHANGE UP
WBC # BLD: 4.75 K/UL — SIGNIFICANT CHANGE UP (ref 3.8–10.5)
WBC # FLD AUTO: 4.75 K/UL — SIGNIFICANT CHANGE UP (ref 3.8–10.5)

## 2020-03-02 PROCEDURE — 99232 SBSQ HOSP IP/OBS MODERATE 35: CPT

## 2020-03-02 PROCEDURE — 73070 X-RAY EXAM OF ELBOW: CPT | Mod: 26,RT

## 2020-03-02 PROCEDURE — 99232 SBSQ HOSP IP/OBS MODERATE 35: CPT | Mod: GC

## 2020-03-02 PROCEDURE — 73060 X-RAY EXAM OF HUMERUS: CPT | Mod: 26,RT

## 2020-03-02 PROCEDURE — 73030 X-RAY EXAM OF SHOULDER: CPT | Mod: 26,RT

## 2020-03-02 RX ORDER — LANOLIN ALCOHOL/MO/W.PET/CERES
3 CREAM (GRAM) TOPICAL AT BEDTIME
Refills: 0 | Status: COMPLETED | OUTPATIENT
Start: 2020-03-02 | End: 2020-03-02

## 2020-03-02 RX ORDER — LEVETIRACETAM 250 MG/1
500 TABLET, FILM COATED ORAL
Refills: 0 | Status: DISCONTINUED | OUTPATIENT
Start: 2020-03-02 | End: 2020-03-03

## 2020-03-02 RX ADMIN — POLYETHYLENE GLYCOL 3350 17 GRAM(S): 17 POWDER, FOR SOLUTION ORAL at 13:37

## 2020-03-02 RX ADMIN — HEPARIN SODIUM 5000 UNIT(S): 5000 INJECTION INTRAVENOUS; SUBCUTANEOUS at 06:29

## 2020-03-02 RX ADMIN — ATORVASTATIN CALCIUM 20 MILLIGRAM(S): 80 TABLET, FILM COATED ORAL at 22:03

## 2020-03-02 RX ADMIN — LEVETIRACETAM 500 MILLIGRAM(S): 250 TABLET, FILM COATED ORAL at 17:32

## 2020-03-02 RX ADMIN — SENNA PLUS 2 TABLET(S): 8.6 TABLET ORAL at 22:03

## 2020-03-02 RX ADMIN — Medication 2: at 22:02

## 2020-03-02 RX ADMIN — Medication 1: at 17:32

## 2020-03-02 RX ADMIN — MIRTAZAPINE 7.5 MILLIGRAM(S): 45 TABLET, ORALLY DISINTEGRATING ORAL at 22:03

## 2020-03-02 RX ADMIN — Medication 3 UNIT(S): at 13:37

## 2020-03-02 RX ADMIN — Medication 3 MILLIGRAM(S): at 02:23

## 2020-03-02 RX ADMIN — Medication 650 MILLIGRAM(S): at 03:15

## 2020-03-02 RX ADMIN — Medication 3 UNIT(S): at 17:32

## 2020-03-02 RX ADMIN — INSULIN GLARGINE 8 UNIT(S): 100 INJECTION, SOLUTION SUBCUTANEOUS at 22:02

## 2020-03-02 RX ADMIN — HEPARIN SODIUM 5000 UNIT(S): 5000 INJECTION INTRAVENOUS; SUBCUTANEOUS at 16:38

## 2020-03-02 RX ADMIN — Medication 81 MILLIGRAM(S): at 13:36

## 2020-03-02 RX ADMIN — Medication 4: at 13:38

## 2020-03-02 RX ADMIN — Medication 650 MILLIGRAM(S): at 02:23

## 2020-03-02 RX ADMIN — HEPARIN SODIUM 5000 UNIT(S): 5000 INJECTION INTRAVENOUS; SUBCUTANEOUS at 22:03

## 2020-03-02 NOTE — PROGRESS NOTE ADULT - SUBJECTIVE AND OBJECTIVE BOX
Neurology Follow up note    Name  DAYSI WILLETT    Subjective:  patient has right shoulder pain  had EEG brain  no further episodes of LOC    Review of Systems:  Constitutional:      no fever  Musculoskeletal:      + shoulder pain                                MEDICATIONS  (STANDING):  aspirin enteric coated 81 milliGRAM(s) Oral daily  atorvastatin 20 milliGRAM(s) Oral at bedtime  dextrose 5%. 1000 milliLiter(s) (50 mL/Hr) IV Continuous <Continuous>  dextrose 50% Injectable 12.5 Gram(s) IV Push once  dextrose 50% Injectable 25 Gram(s) IV Push once  dextrose 50% Injectable 25 Gram(s) IV Push once  heparin  Injectable 5000 Unit(s) SubCutaneous every 8 hours  insulin glargine Injectable (LANTUS) 8 Unit(s) SubCutaneous at bedtime  insulin lispro (HumaLOG) corrective regimen sliding scale   SubCutaneous Before meals and at bedtime  insulin lispro Injectable (HumaLOG) 3 Unit(s) SubCutaneous before breakfast  insulin lispro Injectable (HumaLOG) 3 Unit(s) SubCutaneous before lunch  insulin lispro Injectable (HumaLOG) 3 Unit(s) SubCutaneous before dinner  levETIRAcetam 500 milliGRAM(s) Oral two times a day  mirtazapine 7.5 milliGRAM(s) Oral at bedtime  polyethylene glycol 3350 17 Gram(s) Oral daily  senna 2 Tablet(s) Oral at bedtime    MEDICATIONS  (PRN):  acetaminophen   Tablet .. 650 milliGRAM(s) Oral every 6 hours PRN Mild Pain (1 - 3)  dextrose 40% Gel 15 Gram(s) Oral once PRN Blood Glucose LESS THAN 70 milliGRAM(s)/deciliter  glucagon  Injectable 1 milliGRAM(s) IntraMuscular once PRN Glucose LESS THAN 70 milligrams/deciliter      Allergies    No Known Drug Allergies  shellfish (Anaphylaxis)    Intolerances        Objective:   Vital Signs Last 24 Hrs  T(C): 36.2 (02 Mar 2020 07:58), Max: 36.5 (01 Mar 2020 23:38)  T(F): 97.1 (02 Mar 2020 07:58), Max: 97.7 (01 Mar 2020 23:38)  HR: 77 (02 Mar 2020 11:03) (62 - 80)  BP: 137/66 (02 Mar 2020 07:58) (131/58 - 169/71)  BP(mean): --  RR: 16 (02 Mar 2020 07:58) (16 - 17)  SpO2: 98% (02 Mar 2020 11:03) (97% - 100%)    General Exam:   General appearance: No acute distress                 Cardiovascular: Pedal dorsalis pulses intact bilaterally    Neurological Exam:  Mental Status: Orientated to self, date and place.  Attention intact.  No dysarthria, aphasia or neglect.     Cranial Nerves: CN I - not tested.  PERRL, EOMI, VFF,  No facial asymmetry.      Motor:   Tone: normal.                  Strength: grossly intact but has limited ROM in the right arm/ shoulder      Other:    03-02    142  |  108  |  23<H>  ----------------------------<  107<H>  3.9   |  32<H>  |  0.77    Ca    8.9      02 Mar 2020 07:52    TPro  5.6<L>  /  Alb  2.2<L>  /  TBili  0.3  /  DBili  x   /  AST  30  /  ALT  35  /  AlkPhos  107  03-02 03-02    142  |  108  |  23<H>  ----------------------------<  107<H>  3.9   |  32<H>  |  0.77    Ca    8.9      02 Mar 2020 07:52    TPro  5.6<L>  /  Alb  2.2<L>  /  TBili  0.3  /  DBili  x   /  AST  30  /  ALT  35  /  AlkPhos  107  03-02    LIVER FUNCTIONS - ( 02 Mar 2020 07:52 )  Alb: 2.2 g/dL / Pro: 5.6 g/dL / ALK PHOS: 107 U/L / ALT: 35 U/L DA / AST: 30 U/L / GGT: x             Radiology    EEG Summary/Classification:  Abnormal EEG: awake   1.	Spikes, focal, left temporal  2.	continuous polymorphic slowing, focal, left temporal and frontal region    EEG Impression/Clinical Correlate:  There is evidence of potentially epileptogenic structural abnormality in the left temporal region.

## 2020-03-02 NOTE — PROGRESS NOTE ADULT - ASSESSMENT
Patient is an 84 year old female, with PMH of colon cancer s/p colectomy, DM and OA, who comes in due to unwitnessed fall found on floor by occupational therapist.    pending psych consult for capacity assessment and hoarding issue  Family wants her to be placed in assisted living , PT recc JOYCE

## 2020-03-02 NOTE — PROGRESS NOTE ADULT - PROBLEM SELECTOR PLAN 1
Patient had unwitnessed fall, most likely mechanical from OA  she is c/o pain in R elbow and shoulder  She gives h/o multiple near syncopal episodes  echo was done which was normal  tele did not show any event  tele was d/c   f/u xray of R shoulder and elbow being done now   EEG shows Spikes, focal, left temporal  continuous polymorphic slowing, focal, left temporal and frontal region- concerning for seizures- neuro recommended keppra 500 BID and MR head wo contrast   Pt recc JOYCE

## 2020-03-02 NOTE — PROGRESS NOTE ADULT - ASSESSMENT
Episode of LOC likely due to seizure activity given focal spike on EEG, recommend that the patient is started on Keppra 500mg bid, get MRI brain and seizure/ fall precaution.  The patient cannot drive until event free at least for 1 year.  The patient also c/o right shoulder pain and has limited ROM in that shoulder raising concerning for shoulder dislocation, recommend Xray of the right shoulder and  then consult orthopedics of needed.      stephania resident and Dr. Camp

## 2020-03-02 NOTE — PROGRESS NOTE ADULT - SUBJECTIVE AND OBJECTIVE BOX
PGY 1 Note discussed with supervising resident and primary attending    Patient is a 84y old  Female who presents with a chief complaint of syncope (01 Mar 2020 11:26)      INTERVAL HPI/OVERNIGHT EVENTS: has complaints of not sleeping well     MEDICATIONS  (STANDING):  aspirin enteric coated 81 milliGRAM(s) Oral daily  atorvastatin 20 milliGRAM(s) Oral at bedtime  dextrose 5%. 1000 milliLiter(s) (50 mL/Hr) IV Continuous <Continuous>  dextrose 50% Injectable 12.5 Gram(s) IV Push once  dextrose 50% Injectable 25 Gram(s) IV Push once  dextrose 50% Injectable 25 Gram(s) IV Push once  heparin  Injectable 5000 Unit(s) SubCutaneous every 8 hours  insulin glargine Injectable (LANTUS) 8 Unit(s) SubCutaneous at bedtime  insulin lispro (HumaLOG) corrective regimen sliding scale   SubCutaneous Before meals and at bedtime  insulin lispro Injectable (HumaLOG) 3 Unit(s) SubCutaneous before breakfast  insulin lispro Injectable (HumaLOG) 3 Unit(s) SubCutaneous before lunch  insulin lispro Injectable (HumaLOG) 3 Unit(s) SubCutaneous before dinner  mirtazapine 7.5 milliGRAM(s) Oral at bedtime  polyethylene glycol 3350 17 Gram(s) Oral daily  senna 2 Tablet(s) Oral at bedtime    MEDICATIONS  (PRN):  acetaminophen   Tablet .. 650 milliGRAM(s) Oral every 6 hours PRN Mild Pain (1 - 3)  dextrose 40% Gel 15 Gram(s) Oral once PRN Blood Glucose LESS THAN 70 milliGRAM(s)/deciliter  glucagon  Injectable 1 milliGRAM(s) IntraMuscular once PRN Glucose LESS THAN 70 milligrams/deciliter      __________________________________________________  REVIEW OF SYSTEMS:    CONSTITUTIONAL: No fever,   EYES: no acute visual disturbances  NECK: No pain or stiffness  RESPIRATORY: No cough; No shortness of breath  CARDIOVASCULAR: No chest pain, no palpitations  GASTROINTESTINAL: No pain. No nausea or vomiting; No diarrhea   NEUROLOGICAL: No headache or numbness, no tremors  MUSCULOSKELETAL: + muscle pain  GENITOURINARY: no dysuria, no frequency, no hesitancy  PSYCHIATRY: mild anxiety , poor sleep   ALL OTHER  ROS negative        Vital Signs Last 24 Hrs  T(C): 36.2 (02 Mar 2020 07:58), Max: 36.5 (01 Mar 2020 23:38)  T(F): 97.1 (02 Mar 2020 07:58), Max: 97.7 (01 Mar 2020 23:38)  HR: 77 (02 Mar 2020 11:03) (62 - 80)  BP: 137/66 (02 Mar 2020 07:58) (131/58 - 169/71)  BP(mean): --  RR: 16 (02 Mar 2020 07:58) (16 - 17)  SpO2: 98% (02 Mar 2020 11:03) (97% - 100%)    ________________________________________________  PHYSICAL EXAM:  GENERAL: NAD  HEENT: Normocephalic;  conjunctivae and sclerae clear; moist mucous membranes;   NECK : supple  CHEST/LUNG: Clear to auscultation bilaterally with good air entry   HEART: S1 S2  regular; no murmurs, gallops or rubs  ABDOMEN: lumpy, Nontender, mildly distended; no bowel movement in days, Bowel sounds present  EXTREMITIES: no cyanosis; no edema; no calf tenderness  SKIN: warm and dry; bruising noted on the left forearm, R forearm with mild edema   NERVOUS SYSTEM:  Awake and alert; Oriented  to person; mildly anxious   _________________________________________________  LABS:                        11.9   4.75  )-----------( 163      ( 02 Mar 2020 07:52 )             37.4     03-02    142  |  108  |  23<H>  ----------------------------<  107<H>  3.9   |  32<H>  |  0.77    Ca    8.9      02 Mar 2020 07:52    TPro  5.6<L>  /  Alb  2.2<L>  /  TBili  0.3  /  DBili  x   /  AST  30  /  ALT  35  /  AlkPhos  107  03-02        CAPILLARY BLOOD GLUCOSE      POCT Blood Glucose.: 244 mg/dL (02 Mar 2020 11:30)  POCT Blood Glucose.: 95 mg/dL (02 Mar 2020 08:13)  POCT Blood Glucose.: 432 mg/dL (01 Mar 2020 21:32)  POCT Blood Glucose.: 420 mg/dL (01 Mar 2020 21:27)  POCT Blood Glucose.: 321 mg/dL (01 Mar 2020 16:54)        RADIOLOGY & ADDITIONAL TESTS:    Imaging Personally Reviewed:  YES/NO    Consultant(s) Notes Reviewed:   YES/ No    Care Discussed with Consultants :     Plan of care was discussed with patient and /or primary care giver; all questions and concerns were addressed and care was aligned with patient's wishes. PGY 1 Note discussed with supervising resident and primary attending    Patient is a 84y old  Female who presents with a chief complaint of syncope (01 Mar 2020 11:26)      INTERVAL HPI/OVERNIGHT EVENTS: has complaints of not sleeping well     MEDICATIONS  (STANDING):  aspirin enteric coated 81 milliGRAM(s) Oral daily  atorvastatin 20 milliGRAM(s) Oral at bedtime  dextrose 5%. 1000 milliLiter(s) (50 mL/Hr) IV Continuous <Continuous>  dextrose 50% Injectable 12.5 Gram(s) IV Push once  dextrose 50% Injectable 25 Gram(s) IV Push once  dextrose 50% Injectable 25 Gram(s) IV Push once  heparin  Injectable 5000 Unit(s) SubCutaneous every 8 hours  insulin glargine Injectable (LANTUS) 8 Unit(s) SubCutaneous at bedtime  insulin lispro (HumaLOG) corrective regimen sliding scale   SubCutaneous Before meals and at bedtime  insulin lispro Injectable (HumaLOG) 3 Unit(s) SubCutaneous before breakfast  insulin lispro Injectable (HumaLOG) 3 Unit(s) SubCutaneous before lunch  insulin lispro Injectable (HumaLOG) 3 Unit(s) SubCutaneous before dinner  mirtazapine 7.5 milliGRAM(s) Oral at bedtime  polyethylene glycol 3350 17 Gram(s) Oral daily  senna 2 Tablet(s) Oral at bedtime    MEDICATIONS  (PRN):  acetaminophen   Tablet .. 650 milliGRAM(s) Oral every 6 hours PRN Mild Pain (1 - 3)  dextrose 40% Gel 15 Gram(s) Oral once PRN Blood Glucose LESS THAN 70 milliGRAM(s)/deciliter  glucagon  Injectable 1 milliGRAM(s) IntraMuscular once PRN Glucose LESS THAN 70 milligrams/deciliter      __________________________________________________  REVIEW OF SYSTEMS:    CONSTITUTIONAL: No fever,   EYES: no acute visual disturbances  NECK: No pain or stiffness  RESPIRATORY: No cough; No shortness of breath  CARDIOVASCULAR: No chest pain, no palpitations  GASTROINTESTINAL: No pain. No nausea or vomiting; No diarrhea   NEUROLOGICAL: No headache or numbness, no tremors  MUSCULOSKELETAL: + muscle pain  GENITOURINARY: no dysuria, no frequency, no hesitancy  PSYCHIATRY: mild anxiety , poor sleep   ALL OTHER  ROS negative        Vital Signs Last 24 Hrs  T(C): 36.2 (02 Mar 2020 07:58), Max: 36.5 (01 Mar 2020 23:38)  T(F): 97.1 (02 Mar 2020 07:58), Max: 97.7 (01 Mar 2020 23:38)  HR: 77 (02 Mar 2020 11:03) (62 - 80)  BP: 137/66 (02 Mar 2020 07:58) (131/58 - 169/71)  BP(mean): --  RR: 16 (02 Mar 2020 07:58) (16 - 17)  SpO2: 98% (02 Mar 2020 11:03) (97% - 100%)    ________________________________________________  PHYSICAL EXAM:  GENERAL: NAD  HEENT: Normocephalic;  conjunctivae and sclerae clear; moist mucous membranes;   NECK : supple  CHEST/LUNG: Clear to auscultation bilaterally with good air entry   HEART: S1 S2  regular; no murmurs, gallops or rubs  ABDOMEN: lumpy, Nontender, mildly distended; no bowel movement in days, Bowel sounds present  EXTREMITIES: no cyanosis; no edema; no calf tenderness  SKIN: warm and dry; bruising noted on the left forearm, R forearm with mild edema   NERVOUS SYSTEM:  Awake and alert; Oriented  to person; mildly anxious   _________________________________________________  LABS:                        11.9   4.75  )-----------( 163      ( 02 Mar 2020 07:52 )             37.4     03-02    142  |  108  |  23<H>  ----------------------------<  107<H>  3.9   |  32<H>  |  0.77    Ca    8.9      02 Mar 2020 07:52    TPro  5.6<L>  /  Alb  2.2<L>  /  TBili  0.3  /  DBili  x   /  AST  30  /  ALT  35  /  AlkPhos  107  03-02        CAPILLARY BLOOD GLUCOSE      POCT Blood Glucose.: 244 mg/dL (02 Mar 2020 11:30)  POCT Blood Glucose.: 95 mg/dL (02 Mar 2020 08:13)  POCT Blood Glucose.: 432 mg/dL (01 Mar 2020 21:32)  POCT Blood Glucose.: 420 mg/dL (01 Mar 2020 21:27)  POCT Blood Glucose.: 321 mg/dL (01 Mar 2020 16:54)        RADIOLOGY & ADDITIONAL TESTS:    Imaging Personally Reviewed:  YES/NO  EEG Impression/Clinical Correlate:  There is evidence of potentially epileptogenic structural abnormality in the left temporal region.  Consultant(s) Notes Reviewed:   YES/ No    Care Discussed with Consultants :     Plan of care was discussed with patient and /or primary care giver; all questions and concerns were addressed and care was aligned with patient's wishes.

## 2020-03-02 NOTE — PROGRESS NOTE ADULT - PROBLEM SELECTOR PLAN 5
Psych consulted for hoarding issue and capacity assessment  Hoarding disorder, Adjustment disorder with anxiety  As per psych eval: pt does NOT have capacity to care for herself safely at home, and does not have capacity to determine her own disposition, and next of kin (sister) should be approached for any necessary consents.  will start on mirtazipine as per psych recc to help with mood and sleep

## 2020-03-02 NOTE — ADVANCED PRACTICE NURSE CONSULT - ASSESSMENT
This is a 84yr old female patient admitted for Syncope and Collapse, presenting with a Stage 2 Pressure Injury to the Coccyx (0.5cm x 0.2cm x 0.1cm) with pink tissue, pale tissue, and scant drainage

## 2020-03-02 NOTE — CONSULT NOTE ADULT - SUBJECTIVE AND OBJECTIVE BOX
Patient is an 84 year old female, with PMH of colon cancer s/p colectomy, DM and OA, who comes in due to unwitnessed fall found on floor by occupational therapist. Patient is a poor historian. Patient states that someone slipped on coffee and bumped into her causing her to fall. Patient states that she was in [facility] home but patient was discharged recently from [facility] as per ED note. Patient denies any loss of consciousness or head trauma. Patient states she has some pain in her arm and knees but states that it gets better when she walks around. Denies any chest pain, nausea, vomiting, abdominal pain.     83 y/o Female  presents c/o acute on chronic R shoulder pain for 1 week. Patient states that at [facility] a person seated in front of her slipped on spilled coffee who then bumped into her causing her to fall onto right side. Patient reports the shoulder pain is better at rest and worsened with active range of motion. Denies HS/LOC. Denies numbness/tingling. Denies fever/chills. Denies pain/injury elsewhere. No other complaints.  Pt states she fractured her R clavicle 3 years ago s/p a fall  which healed conservatively and she has had shoulder pain "on and off" since.   Denies any surgeries or injections to the shoulder in the past.     HEALTH ISSUES - PROBLEM Dx:  Acute pain of right shoulder  Seizure  Adjustment disorder with anxiety  Hoarding disorder with poor insight  Osteoarthritis  Rhabdomyolysis  Diabetes mellitus  JOANN (acute kidney injury): JOANN (acute kidney injury)  Syncope: Syncope  Encephalopathy: Encephalopathy        MEDICATIONS  (STANDING):  aspirin enteric coated 81 milliGRAM(s) Oral daily  atorvastatin 20 milliGRAM(s) Oral at bedtime  dextrose 5%. 1000 milliLiter(s) IV Continuous <Continuous>  dextrose 50% Injectable 12.5 Gram(s) IV Push once  dextrose 50% Injectable 25 Gram(s) IV Push once  dextrose 50% Injectable 25 Gram(s) IV Push once  heparin  Injectable 5000 Unit(s) SubCutaneous every 8 hours  insulin glargine Injectable (LANTUS) 8 Unit(s) SubCutaneous at bedtime  insulin lispro (HumaLOG) corrective regimen sliding scale   SubCutaneous Before meals and at bedtime  insulin lispro Injectable (HumaLOG) 3 Unit(s) SubCutaneous before breakfast  insulin lispro Injectable (HumaLOG) 3 Unit(s) SubCutaneous before lunch  insulin lispro Injectable (HumaLOG) 3 Unit(s) SubCutaneous before dinner  levETIRAcetam 500 milliGRAM(s) Oral two times a day  mirtazapine 7.5 milliGRAM(s) Oral at bedtime  polyethylene glycol 3350 17 Gram(s) Oral daily  senna 2 Tablet(s) Oral at bedtime    Allergies    No Known Drug Allergies  shellfish (Anaphylaxis)    Intolerances    Vital Signs Last 24 Hrs  T(C): 36.5 (03-02-20 @ 15:26), Max: 36.5 (03-01-20 @ 23:38)  T(F): 97.7 (03-02-20 @ 15:26), Max: 97.7 (03-01-20 @ 23:38)  HR: 82 (03-02-20 @ 15:26) (62 - 82)  BP: 128/92 (03-02-20 @ 15:26) (128/92 - 137/66)  BP(mean): --  RR: 16 (03-02-20 @ 15:26) (16 - 17)  SpO2: 98% (03-02-20 @ 15:26) (97% - 100%)  Imaging: XR demonstrates R/L proximal humerus fracture    Physical Exam  Gen: NAD, Alert and Awake, Follows Commands, sitting comfortably in a chair  Musculoskeletal:      RIGHT UE: No obvious deformity to shoulder, No TTP, Skin intact, no tenting, No open skin. Old fx deformity noted over distal clavicle from previous fx-no TTP. Active ROM of the shoulder: 0- 80 degrees Forward flexion and abduction, Passively able to FF to 120 and abduction to 100 without pain. Minimal IR/ER due to pain. (+) crepitation upon ROM. SILT in axillary, radial, median, and ulnar distributions. Able to flex/extend the elbow without pain. 2+ radial pulse with brisk cap refill at distal finger tips. Compartments soft and compressible.  strength 4+/5 B/L , NVI, SILT. No swelling. (+) AIN/PIN/M/R/U intact.                  11.9   4.75  )-----------( 163      ( 02 Mar 2020 07:52 )             37.4     02 Mar 2020 07:52    142    |  108    |  23     ----------------------------<  107    3.9     |  32     |  0.77     Ca    8.9        02 Mar 2020 07:52    TPro  5.6    /  Alb  2.2    /  TBili  0.3    /  DBili  x      /  AST  30     /  ALT  35     /  AlkPhos  107    02 Mar 2020 07:52    XR: R Shoulder: No acute fracture or dislocation, although evaluation is slightly limited by osteopenia. There is chronic appearing deformity of the distal right clavicle.      A/P: 83 y/o Female with acute on chronic Right shoulder pain/DJD  - D/w - No suspicion of right shoulder dislocation. Shoulder pain likely 2/2 glenohumeral osteoarthritis      > Recommend pain control and daily physical therapy - WBAT to RUE   - DVT prophylaxis  - Patient orthopedically stable  - Pain control  - Patient is to Follow-up with Dr. Avila in ONE WEEK at [phone]

## 2020-03-02 NOTE — ADVANCED PRACTICE NURSE CONSULT - RECOMMEDATIONS
-Clean the Coccyx wound with normal saline and apply skin prep to the surrounding skin  -Apply a Foam dressing Q 72hrs PRN  -Elevate/float the patients heels using heel protectors and reposition the patient Q 2hrs using wedges or pillolws

## 2020-03-02 NOTE — PROGRESS NOTE ADULT - PROBLEM SELECTOR PLAN 7
c/w pain control as needed post fall   PENDING Rt shoulder/ elbow x rays as opt is having difficulty moving RUE.

## 2020-03-02 NOTE — CONSULT NOTE ADULT - ATTENDING COMMENTS
pt seen and evaluated.  shoulder pain.  treat with sling for comfort pain control  f/u as out patient

## 2020-03-02 NOTE — PROGRESS NOTE ADULT - PROBLEM SELECTOR PLAN 6
Family wants her to be placed in assisted living in [place] with private payments  PT recc JOYCE    working with family

## 2020-03-02 NOTE — DIETITIAN INITIAL EVALUATION ADULT. - OTHER INFO
Pt visited. Pt is OOB to chair. Pt is alert but Pleasantly confused. Reports Good appetite. Food  choices Obtained. Po tolerated. S/P SLP Eval Mech soft thin liquids. Per NSG flow sheet. Wt History Unable to Provide. Pt visited. Pt is OOB to chair. Pt is alert but Pleasantly confused. Reports Good appetite. Food  choices Obtained. Po tolerated. S/P SLP Eval Mech soft thin liquids. Per NSG flow sheet. Wt History Unable to Provide. Pt with stage 2 Pressure ulcer @ coccyx.

## 2020-03-03 VITALS
OXYGEN SATURATION: 98 % | DIASTOLIC BLOOD PRESSURE: 64 MMHG | TEMPERATURE: 98 F | SYSTOLIC BLOOD PRESSURE: 122 MMHG | HEART RATE: 88 BPM | RESPIRATION RATE: 17 BRPM

## 2020-03-03 LAB
ANION GAP SERPL CALC-SCNC: 3 MMOL/L — LOW (ref 5–17)
BUN SERPL-MCNC: 29 MG/DL — HIGH (ref 7–18)
CALCIUM SERPL-MCNC: 8.8 MG/DL — SIGNIFICANT CHANGE UP (ref 8.4–10.5)
CHLORIDE SERPL-SCNC: 108 MMOL/L — SIGNIFICANT CHANGE UP (ref 96–108)
CO2 SERPL-SCNC: 31 MMOL/L — SIGNIFICANT CHANGE UP (ref 22–31)
CREAT SERPL-MCNC: 0.81 MG/DL — SIGNIFICANT CHANGE UP (ref 0.5–1.3)
GLUCOSE BLDC GLUCOMTR-MCNC: 159 MG/DL — HIGH (ref 70–99)
GLUCOSE BLDC GLUCOMTR-MCNC: 220 MG/DL — HIGH (ref 70–99)
GLUCOSE BLDC GLUCOMTR-MCNC: 235 MG/DL — HIGH (ref 70–99)
GLUCOSE BLDC GLUCOMTR-MCNC: 242 MG/DL — HIGH (ref 70–99)
GLUCOSE SERPL-MCNC: 154 MG/DL — HIGH (ref 70–99)
HCT VFR BLD CALC: 38.7 % — SIGNIFICANT CHANGE UP (ref 34.5–45)
HGB BLD-MCNC: 12.1 G/DL — SIGNIFICANT CHANGE UP (ref 11.5–15.5)
MAGNESIUM SERPL-MCNC: 2.1 MG/DL — SIGNIFICANT CHANGE UP (ref 1.6–2.6)
MCHC RBC-ENTMCNC: 29.1 PG — SIGNIFICANT CHANGE UP (ref 27–34)
MCHC RBC-ENTMCNC: 31.3 GM/DL — LOW (ref 32–36)
MCV RBC AUTO: 93 FL — SIGNIFICANT CHANGE UP (ref 80–100)
NRBC # BLD: 0 /100 WBCS — SIGNIFICANT CHANGE UP (ref 0–0)
PHOSPHATE SERPL-MCNC: 2.7 MG/DL — SIGNIFICANT CHANGE UP (ref 2.5–4.5)
PLATELET # BLD AUTO: 194 K/UL — SIGNIFICANT CHANGE UP (ref 150–400)
POTASSIUM SERPL-MCNC: 4.1 MMOL/L — SIGNIFICANT CHANGE UP (ref 3.5–5.3)
POTASSIUM SERPL-SCNC: 4.1 MMOL/L — SIGNIFICANT CHANGE UP (ref 3.5–5.3)
RBC # BLD: 4.16 M/UL — SIGNIFICANT CHANGE UP (ref 3.8–5.2)
RBC # FLD: 14.4 % — SIGNIFICANT CHANGE UP (ref 10.3–14.5)
SODIUM SERPL-SCNC: 142 MMOL/L — SIGNIFICANT CHANGE UP (ref 135–145)
WBC # BLD: 6.01 K/UL — SIGNIFICANT CHANGE UP (ref 3.8–10.5)
WBC # FLD AUTO: 6.01 K/UL — SIGNIFICANT CHANGE UP (ref 3.8–10.5)

## 2020-03-03 PROCEDURE — 85610 PROTHROMBIN TIME: CPT

## 2020-03-03 PROCEDURE — 82746 ASSAY OF FOLIC ACID SERUM: CPT

## 2020-03-03 PROCEDURE — 73070 X-RAY EXAM OF ELBOW: CPT

## 2020-03-03 PROCEDURE — 80053 COMPREHEN METABOLIC PANEL: CPT

## 2020-03-03 PROCEDURE — 80048 BASIC METABOLIC PNL TOTAL CA: CPT

## 2020-03-03 PROCEDURE — 70551 MRI BRAIN STEM W/O DYE: CPT | Mod: 26

## 2020-03-03 PROCEDURE — 72170 X-RAY EXAM OF PELVIS: CPT

## 2020-03-03 PROCEDURE — 84443 ASSAY THYROID STIM HORMONE: CPT

## 2020-03-03 PROCEDURE — 82550 ASSAY OF CK (CPK): CPT

## 2020-03-03 PROCEDURE — 80074 ACUTE HEPATITIS PANEL: CPT

## 2020-03-03 PROCEDURE — 86780 TREPONEMA PALLIDUM: CPT

## 2020-03-03 PROCEDURE — 82553 CREATINE MB FRACTION: CPT

## 2020-03-03 PROCEDURE — 87040 BLOOD CULTURE FOR BACTERIA: CPT

## 2020-03-03 PROCEDURE — 81001 URINALYSIS AUTO W/SCOPE: CPT

## 2020-03-03 PROCEDURE — 80307 DRUG TEST PRSMV CHEM ANLYZR: CPT

## 2020-03-03 PROCEDURE — 73060 X-RAY EXAM OF HUMERUS: CPT

## 2020-03-03 PROCEDURE — 82962 GLUCOSE BLOOD TEST: CPT

## 2020-03-03 PROCEDURE — 82607 VITAMIN B-12: CPT

## 2020-03-03 PROCEDURE — 85027 COMPLETE CBC AUTOMATED: CPT

## 2020-03-03 PROCEDURE — 71045 X-RAY EXAM CHEST 1 VIEW: CPT

## 2020-03-03 PROCEDURE — 93306 TTE W/DOPPLER COMPLETE: CPT

## 2020-03-03 PROCEDURE — 99239 HOSP IP/OBS DSCHRG MGMT >30: CPT

## 2020-03-03 PROCEDURE — 70551 MRI BRAIN STEM W/O DYE: CPT

## 2020-03-03 PROCEDURE — 99285 EMERGENCY DEPT VISIT HI MDM: CPT

## 2020-03-03 PROCEDURE — 80061 LIPID PANEL: CPT

## 2020-03-03 PROCEDURE — 85730 THROMBOPLASTIN TIME PARTIAL: CPT

## 2020-03-03 PROCEDURE — 95819 EEG AWAKE AND ASLEEP: CPT

## 2020-03-03 PROCEDURE — 84100 ASSAY OF PHOSPHORUS: CPT

## 2020-03-03 PROCEDURE — 73030 X-RAY EXAM OF SHOULDER: CPT

## 2020-03-03 PROCEDURE — 93970 EXTREMITY STUDY: CPT

## 2020-03-03 PROCEDURE — 83605 ASSAY OF LACTIC ACID: CPT

## 2020-03-03 PROCEDURE — 83935 ASSAY OF URINE OSMOLALITY: CPT

## 2020-03-03 PROCEDURE — 93005 ELECTROCARDIOGRAM TRACING: CPT

## 2020-03-03 PROCEDURE — 83735 ASSAY OF MAGNESIUM: CPT

## 2020-03-03 PROCEDURE — 82570 ASSAY OF URINE CREATININE: CPT

## 2020-03-03 PROCEDURE — 99223 1ST HOSP IP/OBS HIGH 75: CPT

## 2020-03-03 PROCEDURE — 84484 ASSAY OF TROPONIN QUANT: CPT

## 2020-03-03 PROCEDURE — 84300 ASSAY OF URINE SODIUM: CPT

## 2020-03-03 PROCEDURE — 83036 HEMOGLOBIN GLYCOSYLATED A1C: CPT

## 2020-03-03 PROCEDURE — 87086 URINE CULTURE/COLONY COUNT: CPT

## 2020-03-03 PROCEDURE — 36415 COLL VENOUS BLD VENIPUNCTURE: CPT

## 2020-03-03 PROCEDURE — 84156 ASSAY OF PROTEIN URINE: CPT

## 2020-03-03 PROCEDURE — 70450 CT HEAD/BRAIN W/O DYE: CPT

## 2020-03-03 RX ORDER — ASPIRIN/CALCIUM CARB/MAGNESIUM 324 MG
1 TABLET ORAL
Qty: 30 | Refills: 0
Start: 2020-03-03 | End: 2020-04-01

## 2020-03-03 RX ORDER — INSULIN DETEMIR 100/ML (3)
8 INSULIN PEN (ML) SUBCUTANEOUS
Qty: 240 | Refills: 0
Start: 2020-03-03 | End: 2020-04-01

## 2020-03-03 RX ORDER — MIRTAZAPINE 45 MG/1
1 TABLET, ORALLY DISINTEGRATING ORAL
Qty: 30 | Refills: 0
Start: 2020-03-03 | End: 2020-04-01

## 2020-03-03 RX ORDER — INSULIN LISPRO 100/ML
6 VIAL (ML) SUBCUTANEOUS
Qty: 540 | Refills: 0
Start: 2020-03-03 | End: 2020-04-01

## 2020-03-03 RX ORDER — INSULIN LISPRO 100/ML
6 VIAL (ML) SUBCUTANEOUS
Refills: 0 | Status: DISCONTINUED | OUTPATIENT
Start: 2020-03-03 | End: 2020-03-03

## 2020-03-03 RX ORDER — LEVETIRACETAM 250 MG/1
1 TABLET, FILM COATED ORAL
Qty: 60 | Refills: 0
Start: 2020-03-03 | End: 2020-04-01

## 2020-03-03 RX ORDER — ACETAMINOPHEN 500 MG
2 TABLET ORAL
Qty: 112 | Refills: 0
Start: 2020-03-03 | End: 2020-03-16

## 2020-03-03 RX ORDER — CLOPIDOGREL BISULFATE 75 MG/1
1 TABLET, FILM COATED ORAL
Qty: 30 | Refills: 0
Start: 2020-03-03 | End: 2020-04-01

## 2020-03-03 RX ORDER — INSULIN LISPRO 100/ML
5 VIAL (ML) SUBCUTANEOUS
Qty: 0 | Refills: 0 | DISCHARGE

## 2020-03-03 RX ORDER — SENNA PLUS 8.6 MG/1
2 TABLET ORAL
Qty: 60 | Refills: 0
Start: 2020-03-03 | End: 2020-04-01

## 2020-03-03 RX ORDER — ATORVASTATIN CALCIUM 80 MG/1
1 TABLET, FILM COATED ORAL
Qty: 30 | Refills: 0
Start: 2020-03-03 | End: 2020-04-01

## 2020-03-03 RX ORDER — INSULIN DETEMIR 100/ML (3)
0 INSULIN PEN (ML) SUBCUTANEOUS
Qty: 0 | Refills: 0 | DISCHARGE

## 2020-03-03 RX ORDER — ROSUVASTATIN CALCIUM 5 MG/1
1 TABLET ORAL
Qty: 0 | Refills: 0 | DISCHARGE

## 2020-03-03 RX ADMIN — HEPARIN SODIUM 5000 UNIT(S): 5000 INJECTION INTRAVENOUS; SUBCUTANEOUS at 06:38

## 2020-03-03 RX ADMIN — Medication 81 MILLIGRAM(S): at 13:24

## 2020-03-03 RX ADMIN — LEVETIRACETAM 500 MILLIGRAM(S): 250 TABLET, FILM COATED ORAL at 06:38

## 2020-03-03 RX ADMIN — LEVETIRACETAM 500 MILLIGRAM(S): 250 TABLET, FILM COATED ORAL at 17:49

## 2020-03-03 RX ADMIN — POLYETHYLENE GLYCOL 3350 17 GRAM(S): 17 POWDER, FOR SOLUTION ORAL at 13:24

## 2020-03-03 RX ADMIN — Medication 6 UNIT(S): at 17:41

## 2020-03-03 RX ADMIN — Medication 6 UNIT(S): at 13:25

## 2020-03-03 RX ADMIN — HEPARIN SODIUM 5000 UNIT(S): 5000 INJECTION INTRAVENOUS; SUBCUTANEOUS at 13:24

## 2020-03-03 RX ADMIN — Medication 2: at 13:23

## 2020-03-03 RX ADMIN — Medication 2: at 17:39

## 2020-03-03 NOTE — CONSULT NOTE ADULT - SUBJECTIVE AND OBJECTIVE BOX
84 year old female with PMH of colon cancer s/p colectomy, type 2 DM, and OA presented after unwitnessed fall and was found on the floor by her occupational therapist. Endocrinology was consulted for management of uncontrolled type 2 DM with hyperglycemia, A1c 8.9%.    The patient is a poor historian but reports a longstanding history of type 2 DM. DM is managed by PMD, though she reports poor medical follow up, with Levemir 10-18 units at bedtime and Humalog 5 units TID with meals. She states BG is generally well controlled. She was recently discharged from [facility].  She reports persistent right shoulder pain but otherwise feels well and has food appetite. Fasting BG is at goal following Lantus 8 units at bedtime, but pre-prandial BG remains above goal >200 despite Humalog 3 units TID with meals. She also reports generalized weakness that she attributes to the hospital stay. EEG during hospital stay was significant for spikes, focal, left temporal continuous polymorphic slowing, focal, left temporal and frontal region, concerning for seizures. She was started on keppra. As per pyschiatry evaluation, the patient does not have capacity to care for herself safely at home and does not have capacity to determine disposition.     ALLERGIES  NKDA  shellfish    PAST MEDICAL & SURGICAL HISTORY:  Colon cancer  Osteoarthritis  Diabetes mellitus  Status post cataract extraction: right eye done in September 2017  History of colonoscopy  Status post colectomy      SOCIAL HISTORY  Alcohol: denies   Tobacco: denies   Illicit substance use: denies       FAMILY HISTORY:  Mother-DM      MEDICATIONS  (STANDING):  aspirin enteric coated 81 milliGRAM(s) Oral daily  atorvastatin 20 milliGRAM(s) Oral at bedtime  dextrose 5%. 1000 milliLiter(s) (50 mL/Hr) IV Continuous <Continuous>  dextrose 50% Injectable 12.5 Gram(s) IV Push once  dextrose 50% Injectable 25 Gram(s) IV Push once  dextrose 50% Injectable 25 Gram(s) IV Push once  heparin  Injectable 5000 Unit(s) SubCutaneous every 8 hours  insulin glargine Injectable (LANTUS) 8 Unit(s) SubCutaneous at bedtime  insulin lispro (HumaLOG) corrective regimen sliding scale   SubCutaneous Before meals and at bedtime  insulin lispro Injectable (HumaLOG) 3 Unit(s) SubCutaneous before breakfast  insulin lispro Injectable (HumaLOG) 3 Unit(s) SubCutaneous before lunch  insulin lispro Injectable (HumaLOG) 3 Unit(s) SubCutaneous before dinner  levETIRAcetam 500 milliGRAM(s) Oral two times a day  mirtazapine 7.5 milliGRAM(s) Oral at bedtime  polyethylene glycol 3350 17 Gram(s) Oral daily  senna 2 Tablet(s) Oral at bedtime    MEDICATIONS  (PRN):  acetaminophen   Tablet .. 650 milliGRAM(s) Oral every 6 hours PRN Mild Pain (1 - 3)  dextrose 40% Gel 15 Gram(s) Oral once PRN Blood Glucose LESS THAN 70 milliGRAM(s)/deciliter  glucagon  Injectable 1 milliGRAM(s) IntraMuscular once PRN Glucose LESS THAN 70 milligrams/deciliter      REVIEW OF SYSTEMS:  CONSTITUTIONAL: fatigue, No fever or weight loss   EYES: No eye pain, visual disturbances, or discharge  ENMT:  No difficulty hearing, tinnitus, vertigo; No sinus or throat pain  NECK: No pain or stiffness  RESPIRATORY: No cough, wheezing, chills or hemoptysis; No shortness of breath  CARDIOVASCULAR: No chest pain, palpitations, dizziness, or leg swelling  GASTROINTESTINAL: No abdominal or epigastric pain. No nausea, vomiting, or hematemesis; No diarrhea or constipation. No melena or hematochezia.  GENITOURINARY: No dysuria, frequency, hematuria, or incontinence  NEUROLOGICAL: No headaches, memory loss, loss of strength, numbness, or tremors  SKIN: No itching, burning, rashes, or lesions   LYMPH NODES: No enlarged glands  ENDOCRINE: No heat or cold intolerance; No hair loss  MUSCULOSKELETAL: right shoulder pain, no muscle, back, or extremity pain  PSYCHIATRIC: No depression, anxiety, mood swings, or difficulty sleeping  HEME/LYMPH: No easy bruising, or bleeding gums  ALLERGY AND IMMUNOLOGIC: No hives or eczema    PHYSICAL EXAM:    VITAL SIGNS  T(C): 36.2 (03-03-20 @ 07:54), Max: 37.1 (03-03-20 @ 00:20)  HR: 63 (03-03-20 @ 07:54) (63 - 82)  BP: 151/63 (03-03-20 @ 07:54) (121/47 - 151/63)  RR: 16 (03-03-20 @ 07:54) (16 - 16)  SpO2: 98% (03-03-20 @ 07:54) (97% - 98%)    GENERAL: NAD, well-groomed, well-developed  HEAD:  Atraumatic, Normocephalic  EYES: EOMI, PERRLA, conjunctiva and sclera clear  ENMT: No tonsillar erythema, exudates, or enlargement; Moist mucous membranes, No lesions  NECK: Supple, No JVD, Normal thyroid  NERVOUS SYSTEM:  Alert & Oriented X 2-3, Good concentration; Motor Strength 5/5 B/L upper and lower extremities;   CHEST/LUNG: Clear to ausculation bilaterally; No rales, rhonchi, wheezing, or rubs  HEART: Regular rate and rhythm; No murmurs, rubs, or gallops  ABDOMEN: Soft, Nontender, Nondistended; Bowel sounds present  EXTREMITIES:  2+ Peripheral Pulses, No clubbing, cyanosis, or edema  LYMPH: No lymphadenopathy noted  SKIN: No rashes or lesions    LABS:                        12.1   6.01  )-----------( 194      ( 03 Mar 2020 06:33 )             38.7     03-03    142  |  108  |  29<H>  ----------------------------<  154<H>  4.1   |  31  |  0.81    Ca    8.8      03 Mar 2020 06:33  Phos  2.7     03-03  Mg     2.1     03-03    TPro  5.6<L>  /  Alb  2.2<L>  /  TBili  0.3  /  DBili  x   /  AST  30  /  ALT  35  /  AlkPhos  107  03-02        CAPILLARY BLOOD GLUCOSE      POCT Blood Glucose.: 159 mg/dL (03 Mar 2020 08:16)  POCT Blood Glucose.: 245 mg/dL (02 Mar 2020 21:18)  POCT Blood Glucose.: 169 mg/dL (02 Mar 2020 16:45)  POCT Blood Glucose.: 316 mg/dL (02 Mar 2020 13:36)  POCT Blood Glucose.: 244 mg/dL (02 Mar 2020 11:30)      A1c 8.9%

## 2020-03-03 NOTE — PROGRESS NOTE ADULT - PROBLEM SELECTOR PROBLEM 2
Acute pain of right shoulder
Rhabdomyolysis

## 2020-03-03 NOTE — CONSULT NOTE ADULT - ASSESSMENT
84 year old female with PMH of colon cancer s/p colectomy, type 2 DM, and OA presented after unwitnessed fall and was found on the floor by her occupational therapist. Endocrinology was consulted for management of uncontrolled type 2 DM with hyperglycemia, A1c 8.9%.    1. Uncontrolled Type 2 DM with hyperglycemia  -A1c 8.9%  -fasting BG at goal <180, pre-prandial BG remains above goal >200  -continue Lantus 8 units at bedtime  -Increase to Humalog 6 units TID with meals   -recommend to continue mealtime rapid acting insulin as below  BG 70-100mg/dL 0 units  -150 mg/dL 0 units  -200 mg/dL 1 units  -250 mg/dL 2 units  -300 mg/dL 3 units  -350 mg/dL 4 units  -400 mg/dL 5 units  BG > 400mg/dL 6 units  -FS AC HS  -ensure consistent carbohydrate diet   -will monitor FS and adjust accordingly   --if patient is NPO for any reason please change FS and sliding scale to NPO lispro scale     2. Right shoulder osteoarthritis  -patient with pain at right shoulder  -no evidence of dislocation as per ortho  -ortho follow up as outpatient     3. Seizure  -continue Keppra  -care as per neurology    Plan discussed with primary team  Thank you for allowing me to participate in the care of this patient  Please  call  w/ any questions or concerns [phone]

## 2020-03-03 NOTE — PROGRESS NOTE ADULT - SUBJECTIVE AND OBJECTIVE BOX
MEDICAL ATTENDING NOTE  Patient is a 84y old  Female who presents with a chief complaint of syncope (03 Mar 2020 10:16)      HPI:  Patient is an 84 year old female, with PMH of colon cancer s/p colectomy, DM and OA, who comes in due to unwitnessed fall found on floor by occupational therapist. Patient is a poor historian. Patient states that someone slipped on coffee and bumped into her causing her to fall. Patient states that she was in [facility] home but patient was discharged recently from [facility] as per ED note. Patient denies any loss of consciousness or head trauma. Patient states she has some pain in her arm and knees but states that it gets better when she walks around. Denies any chest pain, nausea, vomiting, abdominal pain. (25 Feb 2020 18:54)      INTERVAL HPI/OVERNIGHT EVENTS: no new complaints    MEDICATIONS  (STANDING):  aspirin enteric coated 81 milliGRAM(s) Oral daily  atorvastatin 20 milliGRAM(s) Oral at bedtime  dextrose 5%. 1000 milliLiter(s) (50 mL/Hr) IV Continuous <Continuous>  dextrose 50% Injectable 12.5 Gram(s) IV Push once  dextrose 50% Injectable 25 Gram(s) IV Push once  dextrose 50% Injectable 25 Gram(s) IV Push once  heparin  Injectable 5000 Unit(s) SubCutaneous every 8 hours  insulin glargine Injectable (LANTUS) 8 Unit(s) SubCutaneous at bedtime  insulin lispro (HumaLOG) corrective regimen sliding scale   SubCutaneous Before meals and at bedtime  insulin lispro Injectable (HumaLOG) 6 Unit(s) SubCutaneous three times a day before meals  levETIRAcetam 500 milliGRAM(s) Oral two times a day  mirtazapine 7.5 milliGRAM(s) Oral at bedtime  polyethylene glycol 3350 17 Gram(s) Oral daily  senna 2 Tablet(s) Oral at bedtime    MEDICATIONS  (PRN):  acetaminophen   Tablet .. 650 milliGRAM(s) Oral every 6 hours PRN Mild Pain (1 - 3)  dextrose 40% Gel 15 Gram(s) Oral once PRN Blood Glucose LESS THAN 70 milliGRAM(s)/deciliter  glucagon  Injectable 1 milliGRAM(s) IntraMuscular once PRN Glucose LESS THAN 70 milligrams/deciliter      __________________________________________________  REVIEW OF SYSTEMS:    CONSTITUTIONAL: No fever,   EYES: no acute visual disturbances  NECK: No pain or stiffness  RESPIRATORY: No cough; No shortness of breath  CARDIOVASCULAR: No chest pain, no palpitations  GASTROINTESTINAL: No pain. No nausea or vomiting; No diarrhea   NEUROLOGICAL: No headache or numbness, no tremors  MUSCULOSKELETAL: No joint pain, no muscle pain  GENITOURINARY: no dysuria, no frequency, no hesitancy  PSYCHIATRY: no depression , no anxiety  ALL OTHER  ROS negative        Vital Signs Last 24 Hrs  T(C): 36.9 (03 Mar 2020 15:46), Max: 37.1 (03 Mar 2020 00:20)  T(F): 98.5 (03 Mar 2020 15:46), Max: 98.8 (03 Mar 2020 00:20)  HR: 88 (03 Mar 2020 15:46) (63 - 88)  BP: 122/64 (03 Mar 2020 15:46) (121/47 - 151/63)  BP(mean): --  RR: 17 (03 Mar 2020 15:46) (16 - 17)  SpO2: 98% (03 Mar 2020 15:46) (97% - 98%)    ________________________________________________  PHYSICAL EXAM:  GENERAL: NAD  HEENT: Normocephalic;  conjunctivae and sclerae clear; moist mucous membranes;   NECK : supple  CHEST/LUNG: Clear to auscultation bilaterally with good air entry   HEART: S1 S2  regular; no murmurs, gallops or rubs  ABDOMEN: Soft, Nontender, Nondistended; Bowel sounds present  EXTREMITIES: no cyanosis; no edema; no calf tenderness  SKIN: warm and dry; no rash  NERVOUS SYSTEM:  Awake and alert; Oriented  to place, person and time ; no new deficits    _________________________________________________  LABS:                        12.1   6.01  )-----------( 194      ( 03 Mar 2020 06:33 )             38.7     03-03    142  |  108  |  29<H>  ----------------------------<  154<H>  4.1   |  31  |  0.81    Ca    8.8      03 Mar 2020 06:33  Phos  2.7     03-03  Mg     2.1     03-03    TPro  5.6<L>  /  Alb  2.2<L>  /  TBili  0.3  /  DBili  x   /  AST  30  /  ALT  35  /  AlkPhos  107  03-02        CAPILLARY BLOOD GLUCOSE      POCT Blood Glucose.: 242 mg/dL (03 Mar 2020 17:01)  POCT Blood Glucose.: 220 mg/dL (03 Mar 2020 13:21)  POCT Blood Glucose.: 235 mg/dL (03 Mar 2020 11:56)  POCT Blood Glucose.: 159 mg/dL (03 Mar 2020 08:16)  POCT Blood Glucose.: 245 mg/dL (02 Mar 2020 21:18)      < from: MR Head No Cont (03.03.20 @ 12:54) >  1)  chronic ischemic changes in both hemispheres with volume loss. No diffusion restriction or acute ischemia. Similar chronic ischemic changes in the brainstem and cerebellum..  2)  ex vacuo enlargement of the ventricles. No collections noted..     < end of copied text >

## 2020-03-03 NOTE — PROGRESS NOTE ADULT - ATTENDING COMMENTS
Patient was examined and discussed with Dr. Cox.     She is alert and cooperative, but remains confused about time and historical details.   Vital Signs Last 24 Hrs  T(C): 36.6 (26 Feb 2020 16:37), Max: 37 (26 Feb 2020 04:33)  T(F): 97.8 (26 Feb 2020 16:37), Max: 98.6 (26 Feb 2020 04:33)  HR: 75 (26 Feb 2020 16:37) (75 - 95)  BP: 139/89 (26 Feb 2020 16:37) (123/68 - 150/63)  BP(mean): --  RR: 18 (26 Feb 2020 16:37) (17 - 20)  SpO2: 98% (26 Feb 2020 16:37) (96% - 100%)  Lungs, clear  Cor, RRR  Ext stasis changes                        11.5   7.74  )-----------( 228      ( 26 Feb 2020 06:30 )             36.4   02-26    145  |  109<H>  |  36<H>  ----------------------------<  110<H>  3.2<L>   |  27  |  0.92    Ca    9.3      26 Feb 2020 06:30  Phos  2.8     02-26  Mg     2.3     02-26    TPro  5.6<L>  /  Alb  2.7<L>  /  TBili  0.5  /  DBili  x   /  AST  45<H>  /  ALT  36  /  AlkPhos  109  02-26  Hemoglobin A1C, Whole Blood in AM (02.26.20 @ 09:48)    Hemoglobin A1C, Whole Blood: 8.9: Method: Immunoassay  < from: Xray Chest 1 View AP/PA (02.25.20 @ 18:02) >    Left basilar opacity consistent with mass, hiatus herniaor infiltrate. Recommend PA and lateral views or chest CT.    Left humeral head sclerosis likely artifactual as above.    < end of copied text >    IMP:  Confusion, improving, but patient is not currently able to care for herself.           Shadow on CXR, no evidence for acute pulmonary process          JOANN, improving  Plan: PT consultation          SW consultation          CXR PA and lateral          f/u echo  I have spoken to patient's niece in law.  Contact for patient's nephew  is [phone].  They would like her to be placed in assisted living in [place], but she has been reluctant.          Psychiatry consultation, to advise treatment and assess capacity.
Patient was examined at the bedside and discussed with Dr. Rg.     She is alert and cooperative, though still confused  Vital Signs Last 24 Hrs  T(C): 36.9 (03 Mar 2020 15:46), Max: 37.1 (03 Mar 2020 00:20)  T(F): 98.5 (03 Mar 2020 15:46), Max: 98.8 (03 Mar 2020 00:20)  HR: 88 (03 Mar 2020 15:46) (63 - 88)  BP: 122/64 (03 Mar 2020 15:46) (121/47 - 151/63)  BP(mean): --  RR: 17 (03 Mar 2020 15:46) (16 - 17)  SpO2: 98% (03 Mar 2020 15:46) (97% - 98%)  Leans toward right side.  Lungs, clear  Cor, RRR  Abdomen, soft  Neurological, alert, oriented to person, non-focal             < from: MR Head No Cont (03.03.20 @ 12:54) >    RESSION:      1)  chronic ischemic changes in both hemispheres with volume loss. No diffusion restriction or acute ischemia. Similar chronic ischemic changes in the brainstem and cerebellum..  2)  ex vacuo enlargement of the ventricles. No collections noted..     < end of copied text >        FINDINGS AND   IMPRESSION: No acute fracture or dislocation, although evaluation is slightly limited by osteopenia. There is chronic appearing deformity of the distal right clavicle.    < end of copied text >      EEG Summary/Classification:  Abnormal EEG: awake   1.Spikes, focal, left temporal  2.continuous polymorphic slowing, focal, left temporal and frontal region    EEG Impression/Clinical Correlate:  There is evidence of potentially epileptogenic structural abnormality in the left temporal region.    IMP:  syncope, no recurrence since being in the hospital.  ACS is r/o.  EEG shows possible epitleptiform abnormalities in the temporal region.  MRi, reviewed.           Insulin has been adjusted, as recommended by Endocrinology  Plan: As suggested by Neurology, will begin keppra 500 mg bid.          Will also Rx ASA, plavix, statin                  Discharge to assisted living with PT services, as requested by nephew, next of kin.
Patient was examined and discussed with Dr. Rg.     She is seated in a chair, feeling better.   Vital Signs Last 24 Hrs  T(C): 36.7 (28 Feb 2020 15:35), Max: 36.7 (28 Feb 2020 07:44)  T(F): 98 (28 Feb 2020 15:35), Max: 98 (28 Feb 2020 07:44)  HR: 93 (28 Feb 2020 15:35) (66 - 93)  BP: 159/78 (28 Feb 2020 15:35) (138/87 - 159/78)  BP(mean): --  RR: 17 (28 Feb 2020 15:35) (16 - 18)  SpO2: 97% (28 Feb 2020 15:35) (96% - 99%)  Lungs, clear  Cor, RRR                        12.2   5.01  )-----------( 184      ( 28 Feb 2020 07:21 )             39.1   02-28    142  |  106  |  21<H>  ----------------------------<  174<H>  3.6   |  32<H>  |  0.80    Ca    8.7      28 Feb 2020 07:21    < from: US Duplex Venous Lower Ext Complete, Bilateral (02.26.20 @ 19:40) >      IMPRESSION:   Unremarkable ultrasound of the bilateral lower extremity deep venous system.        < end of copied text >    < from: Transthoracic Echocardiogram (02.26.20 @ 07:14) >    1. Mitral annular calcification, and calcified mitral  leaflets with normal diastolic opening. Trace mitral  regurgitation.  2. Normal left ventricular internal dimensions and wall  thicknesses with discrete upper septal thickening.  3. Endocardium not well visualized; grossly normal left  ventricular systolic function.   Mild diastolic dysfunction  (stage I).  4. Normal right ventricular size and function.    < end of copied text >    IMP:  s/p fall with rhabdomyolysis, resolved.  JOANN, resolved          Dementia---Behavioral health assessment: "pt does NOT have capacity to care for herself safely at home, and does not have capacity to determine her own disposition, and next of kin (sister) should be approached for any necessary consents."  Plan:  Discharge planning.  Possible JOYCE to assisted living.  Asked Case Management and SW to look into discharge.  Also discussed with family.
Patient was examined at the bedside and discussed with Dr. Rg.     She is alert and cooperative, though confused as to where she is.   Vital Signs Last 24 Hrs  T(C): 36.5 (02 Mar 2020 15:26), Max: 36.5 (01 Mar 2020 23:38)  T(F): 97.7 (02 Mar 2020 15:26), Max: 97.7 (01 Mar 2020 23:38)  HR: 82 (02 Mar 2020 15:26) (62 - 82)  BP: 128/92 (02 Mar 2020 15:26) (128/92 - 137/66)  BP(mean): --  RR: 16 (02 Mar 2020 15:26) (16 - 17)  SpO2: 98% (02 Mar 2020 15:26) (97% - 100%)  Leans toward right side.  Lungs, clear  Cor, RRR  Abdomen, soft  Neurological, alert, oriented to person, non-focal                        11.9   4.75  )-----------( 163      ( 02 Mar 2020 07:52 )             37.4   03-02    142  |  108  |  23<H>  ----------------------------<  107<H>  3.9   |  32<H>  |  0.77    Ca    8.9      02 Mar 2020 07:52    TPro  5.6<L>  /  Alb  2.2<L>  /  TBili  0.3  /  DBili  x   /  AST  30  /  ALT  35  /  AlkPhos  107  03-02  < from: Xray Shoulder 2 Views, Right (03.02.20 @ 12:38) >    FINDINGS AND   IMPRESSION: No acute fracture or dislocation, although evaluation is slightly limited by osteopenia. There is chronic appearing deformity of the distal right clavicle.    < end of copied text >      EEG Summary/Classification:  Abnormal EEG: awake   1.Spikes, focal, left temporal  2.continuous polymorphic slowing, focal, left temporal and frontal region    EEG Impression/Clinical Correlate:  There is evidence of potentially epileptogenic structural abnormality in the left temporal region.    IMP:  syncope, no recurrence since being in the hospital.  ACS is r/o.  EEG shows possible epitleptiform abnormalities in the temporal region.  MRI is pending          Shoulder has chronic deformity of the clavicle  Plan: As suggested by Neurology, will begin keppra 500 mg bid.          MRI tonight, f/u results          Discharge to assisted living with PT services, as requested by nephew, next of kin.
Patient was examined and discussed with Dr. Rg.     She is alert, sitting in a chair, trying to call friends on the telephone.  She remains confused about her surroundings and has decreased recent memory.  She states " I thought I was being transferred out of here.     Vital Signs Last 24 Hrs  T(C): 36.4 (01 Mar 2020 15:46), Max: 37 (29 Feb 2020 23:42)  T(F): 97.5 (01 Mar 2020 15:46), Max: 98.6 (29 Feb 2020 23:42)  HR: 78 (01 Mar 2020 15:46) (68 - 78)  BP: 169/71 (01 Mar 2020 15:46) (158/65 - 169/71)  BP(mean): --  RR: 17 (01 Mar 2020 15:46) (17 - 18)  SpO2: 98% (01 Mar 2020 15:46) (98% - 99%)  Neurological, non-focal exam                          11.9   5.06  )-----------( 170      ( 29 Feb 2020 07:16 )             37.9   02-29    143  |  109<H>  |  23<H>  ----------------------------<  148<H>  3.6   |  29  |  0.83    Ca    8.7      29 Feb 2020 07:16    EEG Summary/Classification:  Abnormal EEG: awake   1.Spikes, focal, left temporal  2.continuous polymorphic slowing, focal, left temporal and frontal region    EEG Impression/Clinical Correlate:  There is evidence of potentially epileptogenic structural abnormality in the left temporal region.      IMP:  Possible seizure focus, may contribute to cognitive dysfunction.   Plan:  Neurology f/u.  Trial of anticonvulsant.            Case management/SW follow up for post-hospital care

## 2020-03-03 NOTE — DISCHARGE NOTE NURSING/CASE MANAGEMENT/SOCIAL WORK - PATIENT PORTAL LINK FT
You can access the FollowMyHealth Patient Portal offered by [facility] by registering at the following website: [email or web address]. By joining [email or web address]’s FollowMyHealth portal, you will also be able to view your health information using other applications (apps) compatible with our system.

## 2020-03-03 NOTE — PROGRESS NOTE ADULT - NSHPATTENDINGPLANDISCUSS_GEN_ALL_CORE
Patient, amie Gilbert
Dr. Rg,, SW
Dr. Rg, Psychiatry, Case Management, SW, family.
Dr. Rg, Case management, 
Dr. Rg

## 2020-10-29 ENCOUNTER — NEW PATIENT (OUTPATIENT)
Dept: URBAN - METROPOLITAN AREA CLINIC 14 | Facility: CLINIC | Age: 85
End: 2020-10-29

## 2020-10-29 VITALS — HEIGHT: 55 IN

## 2020-10-29 DIAGNOSIS — E11.3391: ICD-10-CM

## 2020-10-29 DIAGNOSIS — H43.813: ICD-10-CM

## 2020-10-29 DIAGNOSIS — E11.3312: ICD-10-CM

## 2020-10-29 DIAGNOSIS — H35.3131: ICD-10-CM

## 2020-10-29 PROCEDURE — 92134 CPTRZ OPH DX IMG PST SGM RTA: CPT

## 2020-10-29 PROCEDURE — 92202 OPSCPY EXTND ON/MAC DRAW: CPT

## 2020-10-29 PROCEDURE — 99204 OFFICE O/P NEW MOD 45 MIN: CPT

## 2020-10-29 ASSESSMENT — TONOMETRY
OD_IOP_MMHG: 27
OD_IOP_MMHG: 26
OS_IOP_MMHG: 38
OS_IOP_MMHG: 32

## 2020-10-29 ASSESSMENT — VISUAL ACUITY
OD_SC: 10/200
OS_SC: 20/100+1

## 2020-11-05 ENCOUNTER — FOLLOW UP (OUTPATIENT)
Dept: URBAN - METROPOLITAN AREA CLINIC 14 | Facility: CLINIC | Age: 85
End: 2020-11-05

## 2020-11-05 DIAGNOSIS — H35.3131: ICD-10-CM

## 2020-11-05 DIAGNOSIS — E11.3391: ICD-10-CM

## 2020-11-05 DIAGNOSIS — H43.813: ICD-10-CM

## 2020-11-05 DIAGNOSIS — E11.3312: ICD-10-CM

## 2020-11-05 PROCEDURE — 92014 COMPRE OPH EXAM EST PT 1/>: CPT

## 2020-11-05 PROCEDURE — 92202 OPSCPY EXTND ON/MAC DRAW: CPT

## 2020-11-05 PROCEDURE — 92134 CPTRZ OPH DX IMG PST SGM RTA: CPT

## 2020-11-05 ASSESSMENT — TONOMETRY
OD_IOP_MMHG: 19
OS_IOP_MMHG: 20

## 2020-11-05 ASSESSMENT — VISUAL ACUITY: OS_SC: 20/125-1

## 2020-11-18 ENCOUNTER — FOLLOW UP (OUTPATIENT)
Dept: URBAN - METROPOLITAN AREA CLINIC 14 | Facility: CLINIC | Age: 85
End: 2020-11-18

## 2020-11-18 VITALS — HEIGHT: 55 IN

## 2020-11-18 DIAGNOSIS — H43.12: ICD-10-CM

## 2020-11-18 DIAGNOSIS — H43.813: ICD-10-CM

## 2020-11-18 DIAGNOSIS — Z96.1: ICD-10-CM

## 2020-11-18 DIAGNOSIS — H35.3131: ICD-10-CM

## 2020-11-18 DIAGNOSIS — E11.3391: ICD-10-CM

## 2020-11-18 DIAGNOSIS — H40.1134: ICD-10-CM

## 2020-11-18 DIAGNOSIS — E11.3512: ICD-10-CM

## 2020-11-18 PROCEDURE — 92014 COMPRE OPH EXAM EST PT 1/>: CPT | Mod: 25

## 2020-11-18 PROCEDURE — 67028 INJECTION EYE DRUG: CPT

## 2020-11-18 PROCEDURE — 92202 OPSCPY EXTND ON/MAC DRAW: CPT

## 2020-11-18 PROCEDURE — 92134 CPTRZ OPH DX IMG PST SGM RTA: CPT

## 2020-11-18 ASSESSMENT — TONOMETRY
OS_IOP_MMHG: 34
OS_IOP_MMHG: 29
OD_IOP_MMHG: 21

## 2020-11-18 ASSESSMENT — VISUAL ACUITY: OS_SC: 20/125-2

## 2020-12-23 ENCOUNTER — FOLLOW UP (OUTPATIENT)
Dept: URBAN - METROPOLITAN AREA CLINIC 14 | Facility: CLINIC | Age: 85
End: 2020-12-23

## 2020-12-23 VITALS — HEIGHT: 55 IN

## 2020-12-23 DIAGNOSIS — H43.813: ICD-10-CM

## 2020-12-23 DIAGNOSIS — H43.12: ICD-10-CM

## 2020-12-23 DIAGNOSIS — H35.3131: ICD-10-CM

## 2020-12-23 DIAGNOSIS — E11.3391: ICD-10-CM

## 2020-12-23 DIAGNOSIS — H40.1134: ICD-10-CM

## 2020-12-23 DIAGNOSIS — E11.3512: ICD-10-CM

## 2020-12-23 PROCEDURE — 67028 INJECTION EYE DRUG: CPT

## 2020-12-23 PROCEDURE — 92134 CPTRZ OPH DX IMG PST SGM RTA: CPT

## 2020-12-23 PROCEDURE — 92014 COMPRE OPH EXAM EST PT 1/>: CPT | Mod: 25

## 2020-12-23 PROCEDURE — 92202 OPSCPY EXTND ON/MAC DRAW: CPT

## 2020-12-23 ASSESSMENT — TONOMETRY
OD_IOP_MMHG: 16
OS_IOP_MMHG: 16

## 2020-12-23 ASSESSMENT — VISUAL ACUITY
OS_SC: 20/80-1
OD_SC: 20/160-1

## 2021-01-21 ENCOUNTER — FOLLOW UP (OUTPATIENT)
Dept: URBAN - METROPOLITAN AREA CLINIC 14 | Facility: CLINIC | Age: 86
End: 2021-01-21

## 2021-01-21 VITALS — HEIGHT: 55 IN

## 2021-01-21 DIAGNOSIS — E11.3391: ICD-10-CM

## 2021-01-21 DIAGNOSIS — H43.12: ICD-10-CM

## 2021-01-21 DIAGNOSIS — E11.3512: ICD-10-CM

## 2021-01-21 DIAGNOSIS — H43.813: ICD-10-CM

## 2021-01-21 PROCEDURE — 92202 OPSCPY EXTND ON/MAC DRAW: CPT

## 2021-01-21 PROCEDURE — 92014 COMPRE OPH EXAM EST PT 1/>: CPT | Mod: 25

## 2021-01-21 PROCEDURE — 92134 CPTRZ OPH DX IMG PST SGM RTA: CPT

## 2021-01-21 PROCEDURE — 67028 INJECTION EYE DRUG: CPT

## 2021-01-21 ASSESSMENT — TONOMETRY
OD_IOP_MMHG: 13
OS_IOP_MMHG: 14

## 2021-01-21 ASSESSMENT — VISUAL ACUITY
OD_SC: 20/125-2
OS_SC: 20/100

## 2021-02-25 ENCOUNTER — FOLLOW UP (OUTPATIENT)
Dept: URBAN - METROPOLITAN AREA CLINIC 14 | Facility: CLINIC | Age: 86
End: 2021-02-25

## 2021-02-25 VITALS — HEIGHT: 55 IN

## 2021-02-25 DIAGNOSIS — E11.3512: ICD-10-CM

## 2021-02-25 DIAGNOSIS — E11.3391: ICD-10-CM

## 2021-02-25 DIAGNOSIS — H35.3131: ICD-10-CM

## 2021-02-25 DIAGNOSIS — H43.12: ICD-10-CM

## 2021-02-25 PROCEDURE — 67028 INJECTION EYE DRUG: CPT

## 2021-02-25 PROCEDURE — 92134 CPTRZ OPH DX IMG PST SGM RTA: CPT

## 2021-02-25 PROCEDURE — 92202 OPSCPY EXTND ON/MAC DRAW: CPT

## 2021-02-25 PROCEDURE — 92014 COMPRE OPH EXAM EST PT 1/>: CPT | Mod: 25

## 2021-02-25 ASSESSMENT — VISUAL ACUITY: OS_SC: 20/125

## 2021-02-25 ASSESSMENT — TONOMETRY
OD_IOP_MMHG: 15
OS_IOP_MMHG: 16

## 2021-03-25 ENCOUNTER — FOLLOW UP (OUTPATIENT)
Dept: URBAN - METROPOLITAN AREA CLINIC 14 | Facility: CLINIC | Age: 86
End: 2021-03-25

## 2021-03-25 VITALS — HEIGHT: 55 IN

## 2021-03-25 DIAGNOSIS — H43.12: ICD-10-CM

## 2021-03-25 DIAGNOSIS — E11.3391: ICD-10-CM

## 2021-03-25 DIAGNOSIS — H40.1130: ICD-10-CM

## 2021-03-25 DIAGNOSIS — E11.3512: ICD-10-CM

## 2021-03-25 DIAGNOSIS — H35.3131: ICD-10-CM

## 2021-03-25 DIAGNOSIS — H43.813: ICD-10-CM

## 2021-03-25 PROCEDURE — 92202 OPSCPY EXTND ON/MAC DRAW: CPT

## 2021-03-25 PROCEDURE — 92134 CPTRZ OPH DX IMG PST SGM RTA: CPT

## 2021-03-25 PROCEDURE — 92014 COMPRE OPH EXAM EST PT 1/>: CPT | Mod: 25

## 2021-03-25 PROCEDURE — 67028 INJECTION EYE DRUG: CPT

## 2021-03-25 ASSESSMENT — VISUAL ACUITY: OS_SC: 20/125

## 2021-03-25 ASSESSMENT — TONOMETRY
OS_IOP_MMHG: 15
OD_IOP_MMHG: 13

## 2021-04-22 ENCOUNTER — FOLLOW UP (OUTPATIENT)
Dept: URBAN - METROPOLITAN AREA CLINIC 14 | Facility: CLINIC | Age: 86
End: 2021-04-22

## 2021-04-22 VITALS — HEIGHT: 55 IN

## 2021-04-22 DIAGNOSIS — H43.813: ICD-10-CM

## 2021-04-22 DIAGNOSIS — H35.3131: ICD-10-CM

## 2021-04-22 DIAGNOSIS — E11.3391: ICD-10-CM

## 2021-04-22 DIAGNOSIS — E11.3512: ICD-10-CM

## 2021-04-22 PROCEDURE — PFS EYLEA PFS

## 2021-04-22 PROCEDURE — 92014 COMPRE OPH EXAM EST PT 1/>: CPT | Mod: 25

## 2021-04-22 PROCEDURE — 92202 OPSCPY EXTND ON/MAC DRAW: CPT

## 2021-04-22 PROCEDURE — 92134 CPTRZ OPH DX IMG PST SGM RTA: CPT

## 2021-04-22 PROCEDURE — 67028 INJECTION EYE DRUG: CPT

## 2021-04-22 ASSESSMENT — TONOMETRY
OD_IOP_MMHG: 16
OS_IOP_MMHG: 18

## 2021-04-22 ASSESSMENT — VISUAL ACUITY
OD_SC: 20/160+1
OS_PH: 20/100-2
OS_SC: 20/125+1

## 2021-05-03 NOTE — CONSULT NOTE ADULT - CONSULT REQUESTED DATE/TIME
Anesthesia Pre Eval Note    Anesthesia ROS/Med Hx    Overall Review:  EKG was reviewed     Anesthetic Complication History:  Patient does not have a history of anesthetic complications      Pulmonary Review:    The patient is a current smoker.     Neuro/Psych Review:  Patient does not have a neuro/psych history       Cardiovascular Review:  Exercise tolerance: good (>4 METS)  Positive for hypertension - well controlled    GI/HEPATIC/RENAL Review:  Patient does not have a GI/hepatic/renalhistory       End/Other Review:  Patient does not have an endo/other history    Additional Results:  EKG:  Encounter Date: 04/15/21  -Electrocardiogram 12-Lead       Result                      Value                           Ventricular Rate EKG/M*     139                             Atrial Rate (BPM)           139                             MS-Interval (MSEC)          134                             QRS-Interval (MSEC)         86                              QT-Interval (MSEC)          296                             QTc                         450                             P Axis (Degrees)            53                              R Axis (Degrees)            47                              T Axis (Degrees)            30                              REPORT TEXT                                             Sinus tachycardia   Otherwise normal ECG   When compared with ECG of   23-APR-2021 11:34,   No significant change was found   Confirmed by CLAY GOODSON, PO ([id number]) on 4/27/2021 10:15:06 AM       ALLERGIES:  No Known Allergies       Lab Results       Component                Value               Date                       WBC                      12.9 (H)            05/02/2021                 RBC                      3.33 (L)            05/02/2021                 HGB                      8.4 (L)             05/02/2021                 HCT                      26.8 (L)            05/02/2021                 MCHC                     31.3  (L)            05/02/2021                 SODIUM                   136                 05/02/2021                 POTASSIUM                3.7                 05/02/2021                 CHLORIDE                 102                 05/02/2021                 CO2                      25                  05/02/2021                 GLUCOSE                  129 (H)             05/02/2021                 BUN                      17                  05/02/2021                 CREATININE               0.96 (H)            05/02/2021                 GFRESTIMATE              90 (L)              05/02/2021                 CALCIUM                  9.6                 05/02/2021                 PLT                      815 (H)             05/02/2021                 PTT                      29                  05/03/2021                 INR                      1.0                 04/28/2021             Past Medical History:  No date: Essential (primary) hypertension    History reviewed. No pertinent surgical history.       Prior to Admission medications :  Medication lisinopril-hydroCHLOROthiazide (ZESTORETIC) 20-25 MG per tablet, Sig Take 1 tablet by mouth daily., Start Date , End Date , Taking? Yes, Authorizing Provider Outside Provider            Relevant Problems   No relevant active problems       Physical Exam     Airway   Mallampati: II  TM Distance: >3 FB  Neck ROM: Full  Neck: Non-tender and Able to place in sniff position  TMJ Mobility: Good    Cardiovascular  Cardiovascular exam normal  Cardio Rhythm: Regular  Cardio Rate: Normal    Head Assessment  Head assessment: Normocephalic and Atraumatic    General Assessment  General Assessment: Alert and oriented and No acute distress    Dental Exam  Dental exam normal    Pulmonary Exam  Pulmonary exam normal  Breath sounds clear to auscultation:  Yes    Abdominal Exam  Abdominal exam normal      Anesthesia Plan:  Anesthesia Plan    ASA Status: 2    Anesthesia Type:  13-Aug-2019 17:02 General    Induction: Intravenous  Preferred Airway Type: LMA  Maintenance: Inhalational      Post-op Pain Management: Per Surgeon      Checklist  Reviewed: NPO Status, Allergies, Medications, Problem list and Past Med History  Consent/Risks Discussed Statement:  The proposed anesthetic plan, including its risks and benefits, have been discussed with the Patient along with the risks and benefits of alternatives. Questions were encouraged and answered and the patient and/or representative understands and agrees to proceed.    I have discussed elements of the patient's history or examination, as noted above and/or as follows, that place the patient at higher risk of complications; BMI> 30 (obesity).    I discussed with the patient (and/or patient's legal representative) the risks and benefits of the proposed anesthesia plan, General, which may include services performed by other anesthesia providers.    Alternative anesthesia plans, if available, were reviewed with the patient (and/or patient's legal representative). Discussion has been held with the patient (and/or patient's legal representative) regarding risks of anesthesia, which include vomiting, nausea, dental injury, allergic reaction, oral injury and sore throat and emergent situations that may require change in anesthesia plan.    The patient (and/or patient's legal representative) has indicated understanding, his/her questions have been answered, and he/she wishes to proceed with the planned anesthetic.  Blood Consent    Blood Products: Not Anticipated

## 2021-05-20 ENCOUNTER — FOLLOW UP (OUTPATIENT)
Dept: URBAN - METROPOLITAN AREA CLINIC 14 | Facility: CLINIC | Age: 86
End: 2021-05-20

## 2021-05-20 VITALS — HEIGHT: 55 IN

## 2021-05-20 DIAGNOSIS — E11.3391: ICD-10-CM

## 2021-05-20 DIAGNOSIS — H35.3131: ICD-10-CM

## 2021-05-20 DIAGNOSIS — H43.813: ICD-10-CM

## 2021-05-20 DIAGNOSIS — E11.3512: ICD-10-CM

## 2021-05-20 DIAGNOSIS — H40.1130: ICD-10-CM

## 2021-05-20 PROCEDURE — 92014 COMPRE OPH EXAM EST PT 1/>: CPT | Mod: 25

## 2021-05-20 PROCEDURE — 67028 INJECTION EYE DRUG: CPT

## 2021-05-20 PROCEDURE — 92202 OPSCPY EXTND ON/MAC DRAW: CPT

## 2021-05-20 PROCEDURE — 92134 CPTRZ OPH DX IMG PST SGM RTA: CPT

## 2021-05-20 PROCEDURE — PFS EYLEA PFS

## 2021-05-20 ASSESSMENT — VISUAL ACUITY
OD_SC: 20/160
OS_SC: 20/125-1

## 2021-05-20 ASSESSMENT — TONOMETRY
OS_IOP_MMHG: 13
OD_IOP_MMHG: 15

## 2021-06-24 ENCOUNTER — FOLLOW UP (OUTPATIENT)
Dept: URBAN - METROPOLITAN AREA CLINIC 14 | Facility: CLINIC | Age: 86
End: 2021-06-24

## 2021-06-24 DIAGNOSIS — E11.3391: ICD-10-CM

## 2021-06-24 DIAGNOSIS — E11.3512: ICD-10-CM

## 2021-06-24 DIAGNOSIS — H43.813: ICD-10-CM

## 2021-06-24 DIAGNOSIS — H35.3131: ICD-10-CM

## 2021-06-24 DIAGNOSIS — H35.033: ICD-10-CM

## 2021-06-24 PROCEDURE — 67028 INJECTION EYE DRUG: CPT

## 2021-06-24 PROCEDURE — PFS EYLEA PFS

## 2021-06-24 PROCEDURE — 92014 COMPRE OPH EXAM EST PT 1/>: CPT | Mod: 25

## 2021-06-24 PROCEDURE — 92134 CPTRZ OPH DX IMG PST SGM RTA: CPT

## 2021-06-24 PROCEDURE — 92202 OPSCPY EXTND ON/MAC DRAW: CPT

## 2021-06-24 ASSESSMENT — VISUAL ACUITY
OS_SC: 20/125-1
OD_SC: 20/150-1

## 2021-06-24 ASSESSMENT — TONOMETRY
OS_IOP_MMHG: 13
OD_IOP_MMHG: 16

## 2021-07-29 ENCOUNTER — FOLLOW UP (OUTPATIENT)
Dept: URBAN - METROPOLITAN AREA CLINIC 14 | Facility: CLINIC | Age: 86
End: 2021-07-29

## 2021-07-29 DIAGNOSIS — H35.033: ICD-10-CM

## 2021-07-29 DIAGNOSIS — H35.3131: ICD-10-CM

## 2021-07-29 DIAGNOSIS — E11.3512: ICD-10-CM

## 2021-07-29 DIAGNOSIS — E11.3391: ICD-10-CM

## 2021-07-29 DIAGNOSIS — H04.123: ICD-10-CM

## 2021-07-29 DIAGNOSIS — Z96.1: ICD-10-CM

## 2021-07-29 DIAGNOSIS — H40.1130: ICD-10-CM

## 2021-07-29 DIAGNOSIS — H43.813: ICD-10-CM

## 2021-07-29 PROCEDURE — 67028 INJECTION EYE DRUG: CPT

## 2021-07-29 PROCEDURE — 92014 COMPRE OPH EXAM EST PT 1/>: CPT | Mod: 25

## 2021-07-29 PROCEDURE — 92134 CPTRZ OPH DX IMG PST SGM RTA: CPT

## 2021-07-29 PROCEDURE — 92202 OPSCPY EXTND ON/MAC DRAW: CPT

## 2021-07-29 ASSESSMENT — VISUAL ACUITY
OS_SC: 20/125-1
OD_SC: 20/160-1

## 2021-07-29 ASSESSMENT — TONOMETRY
OD_IOP_MMHG: 16
OS_IOP_MMHG: 13

## 2021-09-09 ENCOUNTER — FOLLOW UP (OUTPATIENT)
Dept: URBAN - METROPOLITAN AREA CLINIC 14 | Facility: CLINIC | Age: 86
End: 2021-09-09

## 2021-09-09 DIAGNOSIS — E11.3512: ICD-10-CM

## 2021-09-09 DIAGNOSIS — E11.3391: ICD-10-CM

## 2021-09-09 PROCEDURE — 92134 CPTRZ OPH DX IMG PST SGM RTA: CPT

## 2021-09-09 ASSESSMENT — TONOMETRY
OD_IOP_MMHG: 18
OS_IOP_MMHG: 35

## 2021-09-09 ASSESSMENT — VISUAL ACUITY
OD_SC: 20/160-1
OS_SC: 20/160+1

## 2021-09-16 ENCOUNTER — FOLLOW UP (OUTPATIENT)
Dept: URBAN - METROPOLITAN AREA CLINIC 14 | Facility: CLINIC | Age: 86
End: 2021-09-16

## 2021-09-16 DIAGNOSIS — E11.3512: ICD-10-CM

## 2021-09-16 DIAGNOSIS — H40.042: ICD-10-CM

## 2021-09-16 PROCEDURE — 92202 OPSCPY EXTND ON/MAC DRAW: CPT

## 2021-09-16 PROCEDURE — 92014 COMPRE OPH EXAM EST PT 1/>: CPT

## 2021-09-16 ASSESSMENT — VISUAL ACUITY
OD_SC: 20/160-1
OS_SC: 20/160+1

## 2021-09-16 ASSESSMENT — TONOMETRY
OD_IOP_MMHG: 13
OS_IOP_MMHG: 17

## 2021-10-14 ENCOUNTER — FOLLOW UP (OUTPATIENT)
Dept: URBAN - METROPOLITAN AREA CLINIC 14 | Facility: CLINIC | Age: 86
End: 2021-10-14

## 2021-10-14 DIAGNOSIS — E11.3391: ICD-10-CM

## 2021-10-14 DIAGNOSIS — E11.3512: ICD-10-CM

## 2021-10-14 DIAGNOSIS — H40.042: ICD-10-CM

## 2021-10-14 PROCEDURE — 67028 INJECTION EYE DRUG: CPT

## 2021-10-14 PROCEDURE — 92202 OPSCPY EXTND ON/MAC DRAW: CPT

## 2021-10-14 PROCEDURE — PFS EYLEA PFS

## 2021-10-14 PROCEDURE — 92134 CPTRZ OPH DX IMG PST SGM RTA: CPT

## 2021-10-14 PROCEDURE — 92014 COMPRE OPH EXAM EST PT 1/>: CPT | Mod: 25

## 2021-10-14 ASSESSMENT — VISUAL ACUITY
OD_SC: 20/160-1
OS_SC: 20/125

## 2021-10-14 ASSESSMENT — TONOMETRY
OD_IOP_MMHG: 14
OS_IOP_MMHG: 14

## 2021-10-20 NOTE — DISCHARGE NOTE NURSING/CASE MANAGEMENT/SOCIAL WORK - MODE OF TRANSPORTATION
no lesions,  no deformities,  no traumatic injuries,  no significant scars are present,  chest wall non-tender,  no masses present, breathing is unlabored without accessory muscle use,normal breath sounds
Ambulance

## 2021-11-18 ENCOUNTER — FOLLOW UP (OUTPATIENT)
Dept: URBAN - METROPOLITAN AREA CLINIC 14 | Facility: CLINIC | Age: 86
End: 2021-11-18

## 2021-11-18 DIAGNOSIS — H40.042: ICD-10-CM

## 2021-11-18 DIAGNOSIS — E11.3391: ICD-10-CM

## 2021-11-18 DIAGNOSIS — E11.3512: ICD-10-CM

## 2021-11-18 DIAGNOSIS — H40.1130: ICD-10-CM

## 2021-11-18 PROCEDURE — 92134 CPTRZ OPH DX IMG PST SGM RTA: CPT

## 2021-11-18 PROCEDURE — 67028 INJECTION EYE DRUG: CPT

## 2021-11-18 PROCEDURE — PFS EYLEA PFS

## 2021-11-18 PROCEDURE — 92202 OPSCPY EXTND ON/MAC DRAW: CPT

## 2021-11-18 PROCEDURE — 92014 COMPRE OPH EXAM EST PT 1/>: CPT | Mod: 25

## 2021-11-18 ASSESSMENT — VISUAL ACUITY: OS_SC: 20/200

## 2021-11-18 ASSESSMENT — TONOMETRY
OS_IOP_MMHG: 8
OD_IOP_MMHG: 12

## 2022-01-24 ENCOUNTER — FOLLOW UP (OUTPATIENT)
Dept: URBAN - METROPOLITAN AREA CLINIC 14 | Facility: CLINIC | Age: 87
End: 2022-01-24

## 2022-01-24 DIAGNOSIS — E11.3391: ICD-10-CM

## 2022-01-24 DIAGNOSIS — H40.042: ICD-10-CM

## 2022-01-24 DIAGNOSIS — E11.3512: ICD-10-CM

## 2022-01-24 PROCEDURE — 92202 OPSCPY EXTND ON/MAC DRAW: CPT

## 2022-01-24 PROCEDURE — 67028 INJECTION EYE DRUG: CPT

## 2022-01-24 PROCEDURE — 92134 CPTRZ OPH DX IMG PST SGM RTA: CPT

## 2022-01-24 PROCEDURE — PFS EYLEA PFS

## 2022-01-24 PROCEDURE — 92014 COMPRE OPH EXAM EST PT 1/>: CPT | Mod: 25

## 2022-01-24 ASSESSMENT — VISUAL ACUITY
OS_PH: 20/160+1
OS_SC: 10/200

## 2022-01-24 ASSESSMENT — TONOMETRY
OS_IOP_MMHG: 10
OD_IOP_MMHG: 16

## 2022-02-24 ENCOUNTER — FOLLOW UP (OUTPATIENT)
Dept: URBAN - METROPOLITAN AREA CLINIC 14 | Facility: CLINIC | Age: 87
End: 2022-02-24

## 2022-02-24 DIAGNOSIS — H01.025: ICD-10-CM

## 2022-02-24 DIAGNOSIS — H40.042: ICD-10-CM

## 2022-02-24 DIAGNOSIS — H10.012: ICD-10-CM

## 2022-02-24 DIAGNOSIS — H04.123: ICD-10-CM

## 2022-02-24 DIAGNOSIS — E11.3391: ICD-10-CM

## 2022-02-24 DIAGNOSIS — E11.3512: ICD-10-CM

## 2022-02-24 DIAGNOSIS — H02.135: ICD-10-CM

## 2022-02-24 PROCEDURE — 92014 COMPRE OPH EXAM EST PT 1/>: CPT | Mod: 25

## 2022-02-24 PROCEDURE — 92202 OPSCPY EXTND ON/MAC DRAW: CPT

## 2022-02-24 PROCEDURE — 92134 CPTRZ OPH DX IMG PST SGM RTA: CPT

## 2022-02-24 ASSESSMENT — TONOMETRY
OS_IOP_MMHG: 14
OD_IOP_MMHG: 11

## 2022-02-24 ASSESSMENT — VISUAL ACUITY: OS_SC: 8/200

## 2022-03-10 ENCOUNTER — FOLLOW UP (OUTPATIENT)
Dept: URBAN - METROPOLITAN AREA CLINIC 14 | Facility: CLINIC | Age: 87
End: 2022-03-10

## 2022-03-10 DIAGNOSIS — E11.3391: ICD-10-CM

## 2022-03-10 DIAGNOSIS — E11.3512: ICD-10-CM

## 2022-03-10 PROCEDURE — 92014 COMPRE OPH EXAM EST PT 1/>: CPT | Mod: 25

## 2022-03-10 PROCEDURE — 67028 INJECTION EYE DRUG: CPT

## 2022-03-10 PROCEDURE — 92134 CPTRZ OPH DX IMG PST SGM RTA: CPT

## 2022-03-10 PROCEDURE — PFS EYLEA PFS

## 2022-03-10 PROCEDURE — 92202 OPSCPY EXTND ON/MAC DRAW: CPT

## 2022-03-10 ASSESSMENT — TONOMETRY
OD_IOP_MMHG: 19
OS_IOP_MMHG: 23

## 2022-04-14 ENCOUNTER — FOLLOW UP (OUTPATIENT)
Dept: URBAN - METROPOLITAN AREA CLINIC 14 | Facility: CLINIC | Age: 87
End: 2022-04-14

## 2022-04-14 DIAGNOSIS — H01.025: ICD-10-CM

## 2022-04-14 DIAGNOSIS — E11.3512: ICD-10-CM

## 2022-04-14 DIAGNOSIS — E11.3391: ICD-10-CM

## 2022-04-14 DIAGNOSIS — H04.123: ICD-10-CM

## 2022-04-14 DIAGNOSIS — H10.012: ICD-10-CM

## 2022-04-14 DIAGNOSIS — H40.042: ICD-10-CM

## 2022-04-14 DIAGNOSIS — H02.135: ICD-10-CM

## 2022-04-14 PROCEDURE — PFS EYLEA PFS

## 2022-04-14 PROCEDURE — 92202 OPSCPY EXTND ON/MAC DRAW: CPT

## 2022-04-14 PROCEDURE — 92014 COMPRE OPH EXAM EST PT 1/>: CPT | Mod: 25

## 2022-04-14 PROCEDURE — 92134 CPTRZ OPH DX IMG PST SGM RTA: CPT

## 2022-04-14 PROCEDURE — 67028 INJECTION EYE DRUG: CPT

## 2022-04-14 ASSESSMENT — TONOMETRY
OS_IOP_MMHG: 17
OD_IOP_MMHG: 22

## 2022-04-14 ASSESSMENT — VISUAL ACUITY: OS_SC: 20/160+1

## 2022-05-19 ENCOUNTER — FOLLOW UP (OUTPATIENT)
Dept: URBAN - METROPOLITAN AREA CLINIC 14 | Facility: CLINIC | Age: 87
End: 2022-05-19

## 2022-05-19 DIAGNOSIS — E11.3512: ICD-10-CM

## 2022-05-19 PROCEDURE — 92014 COMPRE OPH EXAM EST PT 1/>: CPT | Mod: 25

## 2022-05-19 PROCEDURE — PFS EYLEA PFS

## 2022-05-19 PROCEDURE — 92134 CPTRZ OPH DX IMG PST SGM RTA: CPT

## 2022-05-19 PROCEDURE — 92202 OPSCPY EXTND ON/MAC DRAW: CPT

## 2022-05-19 PROCEDURE — 67028 INJECTION EYE DRUG: CPT

## 2022-05-19 ASSESSMENT — VISUAL ACUITY: OS_SC: 20/160+1

## 2022-05-19 ASSESSMENT — TONOMETRY
OS_IOP_MMHG: 14
OD_IOP_MMHG: 13

## 2022-06-16 ENCOUNTER — FOLLOW UP (OUTPATIENT)
Dept: URBAN - METROPOLITAN AREA CLINIC 14 | Facility: CLINIC | Age: 87
End: 2022-06-16

## 2022-06-16 DIAGNOSIS — H40.042: ICD-10-CM

## 2022-06-16 DIAGNOSIS — E11.3512: ICD-10-CM

## 2022-06-16 DIAGNOSIS — H04.123: ICD-10-CM

## 2022-06-16 DIAGNOSIS — H02.135: ICD-10-CM

## 2022-06-16 DIAGNOSIS — H10.012: ICD-10-CM

## 2022-06-16 DIAGNOSIS — H01.025: ICD-10-CM

## 2022-06-16 DIAGNOSIS — E11.3391: ICD-10-CM

## 2022-06-16 PROCEDURE — 92134 CPTRZ OPH DX IMG PST SGM RTA: CPT

## 2022-06-16 PROCEDURE — 92014 COMPRE OPH EXAM EST PT 1/>: CPT | Mod: 25

## 2022-06-16 PROCEDURE — 67028 INJECTION EYE DRUG: CPT

## 2022-06-16 PROCEDURE — PFS EYLEA PFS

## 2022-06-16 PROCEDURE — 92202 OPSCPY EXTND ON/MAC DRAW: CPT

## 2022-06-16 ASSESSMENT — VISUAL ACUITY: OS_SC: 20/125-1

## 2022-06-16 ASSESSMENT — TONOMETRY
OS_IOP_MMHG: 11
OD_IOP_MMHG: 15

## 2022-07-21 ENCOUNTER — FOLLOW UP (OUTPATIENT)
Dept: URBAN - METROPOLITAN AREA CLINIC 14 | Facility: CLINIC | Age: 87
End: 2022-07-21

## 2022-07-21 DIAGNOSIS — H40.042: ICD-10-CM

## 2022-07-21 DIAGNOSIS — H35.033: ICD-10-CM

## 2022-07-21 DIAGNOSIS — H10.012: ICD-10-CM

## 2022-07-21 DIAGNOSIS — E11.3512: ICD-10-CM

## 2022-07-21 DIAGNOSIS — H01.025: ICD-10-CM

## 2022-07-21 DIAGNOSIS — H04.123: ICD-10-CM

## 2022-07-21 DIAGNOSIS — E11.3391: ICD-10-CM

## 2022-07-21 DIAGNOSIS — H02.135: ICD-10-CM

## 2022-07-21 DIAGNOSIS — H35.3131: ICD-10-CM

## 2022-07-21 PROCEDURE — 92202 OPSCPY EXTND ON/MAC DRAW: CPT | Mod: 59

## 2022-07-21 PROCEDURE — 67028 INJECTION EYE DRUG: CPT

## 2022-07-21 PROCEDURE — 92134 CPTRZ OPH DX IMG PST SGM RTA: CPT

## 2022-07-21 PROCEDURE — 92014 COMPRE OPH EXAM EST PT 1/>: CPT | Mod: 25

## 2022-07-21 PROCEDURE — PFS EYLEA PFS

## 2022-07-21 ASSESSMENT — TONOMETRY
OS_IOP_MMHG: 18
OD_IOP_MMHG: 20

## 2022-07-21 ASSESSMENT — VISUAL ACUITY
OD_SC: CF 1FT
OS_SC: 20/160+1

## 2022-08-18 ENCOUNTER — FOLLOW UP (OUTPATIENT)
Dept: URBAN - METROPOLITAN AREA CLINIC 14 | Facility: CLINIC | Age: 87
End: 2022-08-18

## 2022-08-18 DIAGNOSIS — H02.135: ICD-10-CM

## 2022-08-18 DIAGNOSIS — H40.042: ICD-10-CM

## 2022-08-18 DIAGNOSIS — H43.813: ICD-10-CM

## 2022-08-18 DIAGNOSIS — H04.123: ICD-10-CM

## 2022-08-18 DIAGNOSIS — E11.3391: ICD-10-CM

## 2022-08-18 DIAGNOSIS — H35.033: ICD-10-CM

## 2022-08-18 DIAGNOSIS — H10.012: ICD-10-CM

## 2022-08-18 DIAGNOSIS — H01.025: ICD-10-CM

## 2022-08-18 DIAGNOSIS — H35.3131: ICD-10-CM

## 2022-08-18 DIAGNOSIS — E11.3512: ICD-10-CM

## 2022-08-18 PROCEDURE — PFS EYLEA PFS

## 2022-08-18 PROCEDURE — 67028 INJECTION EYE DRUG: CPT

## 2022-08-18 PROCEDURE — 92202 OPSCPY EXTND ON/MAC DRAW: CPT

## 2022-08-18 PROCEDURE — 92014 COMPRE OPH EXAM EST PT 1/>: CPT | Mod: 25

## 2022-08-18 PROCEDURE — 92134 CPTRZ OPH DX IMG PST SGM RTA: CPT

## 2022-08-18 ASSESSMENT — TONOMETRY
OS_IOP_MMHG: 14
OD_IOP_MMHG: 16

## 2022-08-18 ASSESSMENT — VISUAL ACUITY: OS_SC: 20/125+1

## 2022-09-17 NOTE — INPATIENT CERTIFICATION FOR MEDICARE PATIENTS - NS ICMP TWO DAYS INPATIENT
Yes
Chon Smart)  Hematology; Internal Medicine; Medical Oncology  [address], [place] [address]  Phone: [phone]  Fax: [phone]  Follow Up Time:

## 2022-09-22 ENCOUNTER — FOLLOW UP (OUTPATIENT)
Dept: URBAN - METROPOLITAN AREA CLINIC 14 | Facility: CLINIC | Age: 87
End: 2022-09-22

## 2022-09-22 DIAGNOSIS — E11.3391: ICD-10-CM

## 2022-09-22 DIAGNOSIS — H01.025: ICD-10-CM

## 2022-09-22 DIAGNOSIS — E11.3512: ICD-10-CM

## 2022-09-22 DIAGNOSIS — H10.012: ICD-10-CM

## 2022-09-22 DIAGNOSIS — H02.135: ICD-10-CM

## 2022-09-22 DIAGNOSIS — H40.042: ICD-10-CM

## 2022-09-22 DIAGNOSIS — H04.123: ICD-10-CM

## 2022-09-22 PROCEDURE — 92134 CPTRZ OPH DX IMG PST SGM RTA: CPT

## 2022-09-22 PROCEDURE — 92202 OPSCPY EXTND ON/MAC DRAW: CPT

## 2022-09-22 PROCEDURE — PFS EYLEA PFS

## 2022-09-22 PROCEDURE — 92014 COMPRE OPH EXAM EST PT 1/>: CPT | Mod: 25

## 2022-09-22 PROCEDURE — 67028 INJECTION EYE DRUG: CPT

## 2022-09-22 ASSESSMENT — VISUAL ACUITY: OS_SC: 20/160-1

## 2022-09-22 ASSESSMENT — TONOMETRY
OD_IOP_MMHG: 19
OS_IOP_MMHG: 15

## 2022-10-27 ENCOUNTER — FOLLOW UP (OUTPATIENT)
Dept: URBAN - METROPOLITAN AREA CLINIC 14 | Facility: CLINIC | Age: 87
End: 2022-10-27

## 2022-10-27 DIAGNOSIS — H04.123: ICD-10-CM

## 2022-10-27 DIAGNOSIS — E11.3391: ICD-10-CM

## 2022-10-27 DIAGNOSIS — E11.3512: ICD-10-CM

## 2022-10-27 DIAGNOSIS — H35.3131: ICD-10-CM

## 2022-10-27 PROCEDURE — 92014 COMPRE OPH EXAM EST PT 1/>: CPT | Mod: 25

## 2022-10-27 PROCEDURE — 67028 INJECTION EYE DRUG: CPT

## 2022-10-27 PROCEDURE — 92202 OPSCPY EXTND ON/MAC DRAW: CPT | Mod: 59

## 2022-10-27 PROCEDURE — 92134 CPTRZ OPH DX IMG PST SGM RTA: CPT

## 2022-10-27 PROCEDURE — PFS EYLEA PFS

## 2022-10-27 ASSESSMENT — TONOMETRY
OS_IOP_MMHG: 19
OD_IOP_MMHG: 20

## 2022-10-27 ASSESSMENT — VISUAL ACUITY: OS_SC: 20/160

## 2023-03-16 ENCOUNTER — FOLLOW UP (OUTPATIENT)
Dept: URBAN - METROPOLITAN AREA CLINIC 14 | Facility: CLINIC | Age: 88
End: 2023-03-16

## 2023-03-16 DIAGNOSIS — E11.3552: ICD-10-CM

## 2023-03-16 DIAGNOSIS — H35.3131: ICD-10-CM

## 2023-03-16 DIAGNOSIS — H04.123: ICD-10-CM

## 2023-03-16 DIAGNOSIS — E11.3512: ICD-10-CM

## 2023-03-16 DIAGNOSIS — E11.3391: ICD-10-CM

## 2023-03-16 PROCEDURE — 92134 CPTRZ OPH DX IMG PST SGM RTA: CPT

## 2023-03-16 PROCEDURE — 67028 INJECTION EYE DRUG: CPT

## 2023-03-16 PROCEDURE — 92014 COMPRE OPH EXAM EST PT 1/>: CPT | Mod: 25

## 2023-03-16 PROCEDURE — 92202 OPSCPY EXTND ON/MAC DRAW: CPT | Mod: 59

## 2023-08-16 NOTE — PHYSICAL THERAPY INITIAL EVALUATION ADULT - LEVEL OF INDEPENDENCE: SIT/STAND, REHAB EVAL
moderate assist (50% patients effort) Consent (Scalp)/Introductory Paragraph: The rationale for Mohs was explained to the patient and consent was obtained. The risks, benefits and alternatives to therapy were discussed in detail. Specifically, the risks of changes in hair growth pattern secondary to repair, infection, scarring, bleeding, prolonged wound healing, incomplete removal, allergy to anesthesia, nerve injury and recurrence were addressed. Prior to the procedure, the treatment site was clearly identified and confirmed by the patient. All components of Universal Protocol/PAUSE Rule completed.

## 2023-09-12 NOTE — ED PROVIDER NOTE - CPE EDP [facility] NORM
I saw Carson Gar today for the first time in Sleep medicine, she was previously followed by Dr. Naseem Hooper. In review of her sleep study from 2012, he mentioned her ECG showed atrial fibrillation. I reviewed her chart in detail and did not see any mention of atrial fibrillation. The ECG is limited to 2 leads in the sleep lab. It doesn't look like she has had any extended monitoring in the past.  I wanted to bring this to your attention, given her history of CHF. She has not been using her CPAP as much as she should. She often falls asleep before putting it on. The importance of increasing use and complications associated with non-use were stressed.    Thank you, EMEKA Calvin
normal...

## 2024-01-03 NOTE — PATIENT PROFILE ADULT - MONEY FOR FOOD
Swallow evaluation performed. Pt coughed with small sip of water x2.  PO meds held.  MD notified.   no

## 2024-04-15 NOTE — PATIENT PROFILE ADULT - NSPROMUTPARTICIPCAREFT_GEN_A_NUR
[de-identified] : Assessment:   The patient presents with history, examination and imaging that are most consistent with a diagnosis of: Left shoulder arthritis, right knee arthritis, B/L posterior tibialis tendon insufficiency    The patient would like to pursue conservative measures at this time. After consideration of various non-operative treatment modalities, the patient would like to proceed with the modalities listed below.   During this appointment the patient was examined, diagnoses were discussed and explained in a face to face manner. In addition extensive time was spent reviewing aforementioned diagnostic studies. Counseling including abnormal image results, differential diagnoses, treatment options, risk and benefits, lifestyle changes, current condition, and current medications was performed. Patient's comments, questions, and concerns were address and patient verbalized understanding.   ---------------------------     Plan:  In regards to LEFT SHOULDER & RIGHT KNEE... - CSI in left shoulder and left knee today - tolerated well - Ice frequently  In regards to B/L ankles.... - Recommend patient obtain orthotics  - Refer to Dr. Casas or Dr. Ray - MRI ordered of B/L knees to eval for tendon tear   Follow-up:  PRN none

## 2025-01-22 NOTE — ED PROVIDER NOTE - INTERNATIONAL TRAVEL
No
Detail Level: Zone
Continue Regimen: Absorica 30 mg capsule Bid\\nSig: Take one capsule twice daily with a fatty meal
Render In Strict Bullet Format?: No

## 2025-04-10 NOTE — ED ADULT NURSE NOTE - BREATHING, MLM
RASHID  [facility]  [address] [id number]     April 10, 2025    Patient: Na Vasquez   YOB: 1977   Date of Visit: 4/10/2025       To Whom It May Concern:    Na Vasquez was seen and treated in our department on 4/10/2025.  She should be excused from missed work for today and tomorrow.    Sincerely,     Pernell Hernandez P.A.-C.                
Spontaneous, unlabored and symmetrical

## 2025-07-07 NOTE — BEHAVIORAL HEALTH ASSESSMENT NOTE - COLLATERAL SOURCE
RN called [phone] to reach   Patient with help from Neeta Marquez; ID number [id number].     Contact Information  [phone] (Mobile)         No answer. Left message on voicemail for patient to call back cardio office and ask for RN    (*when patient calls back- okay to send to triage RN-  **Please relay follow up on Event below:  -please send back to MD pool with further questions/clarifications-  Thank you!)     None available

## (undated) RX ORDER — BRIMONIDINE TARTRATE 1 MG/ML
1 SOLUTION/ DROPS OPHTHALMIC TWICE A DAY
Start: 2020-10-29

## (undated) RX ORDER — BRIMONIDINE TARTRATE 2 MG/MG
1 SOLUTION/ DROPS OPHTHALMIC
Start: 2021-09-09

## (undated) RX ORDER — DORZOLAMIDE HYDROCHLORIDE AND TIMOLOL MALEATE 20; 5 MG/ML; MG/ML
1 SOLUTION/ DROPS OPHTHALMIC TWICE A DAY
Start: 2020-10-29

## (undated) RX ORDER — BIMATOPROST 0.1 MG/ML
1 SOLUTION/ DROPS OPHTHALMIC EVERY EVENING
Start: 2020-11-18